# Patient Record
Sex: MALE | Race: WHITE | NOT HISPANIC OR LATINO | Employment: FULL TIME | URBAN - NONMETROPOLITAN AREA
[De-identification: names, ages, dates, MRNs, and addresses within clinical notes are randomized per-mention and may not be internally consistent; named-entity substitution may affect disease eponyms.]

---

## 2022-06-24 ENCOUNTER — OFFICE VISIT (OUTPATIENT)
Dept: FAMILY MEDICINE CLINIC | Facility: CLINIC | Age: 27
End: 2022-06-24

## 2022-06-24 ENCOUNTER — TELEPHONE (OUTPATIENT)
Dept: FAMILY MEDICINE CLINIC | Facility: CLINIC | Age: 27
End: 2022-06-24

## 2022-06-24 VITALS
DIASTOLIC BLOOD PRESSURE: 86 MMHG | HEIGHT: 63 IN | SYSTOLIC BLOOD PRESSURE: 147 MMHG | OXYGEN SATURATION: 97 % | TEMPERATURE: 98.2 F | WEIGHT: 312 LBS | BODY MASS INDEX: 55.28 KG/M2 | RESPIRATION RATE: 16 BRPM | HEART RATE: 87 BPM

## 2022-06-24 DIAGNOSIS — E11.65 TYPE 2 DIABETES MELLITUS WITH HYPERGLYCEMIA, WITHOUT LONG-TERM CURRENT USE OF INSULIN: Primary | ICD-10-CM

## 2022-06-24 DIAGNOSIS — E66.01 MORBID OBESITY WITH BODY MASS INDEX OF 50.0-59.9 IN ADULT: ICD-10-CM

## 2022-06-24 DIAGNOSIS — R03.0 ELEVATED BLOOD PRESSURE READING IN OFFICE WITHOUT DIAGNOSIS OF HYPERTENSION: ICD-10-CM

## 2022-06-24 DIAGNOSIS — R73.9 HYPERGLYCEMIA: Primary | ICD-10-CM

## 2022-06-24 LAB
EXPIRATION DATE: NORMAL
HBA1C MFR BLD: 9.2 %
Lab: NORMAL

## 2022-06-24 PROCEDURE — 83036 HEMOGLOBIN GLYCOSYLATED A1C: CPT | Performed by: NURSE PRACTITIONER

## 2022-06-24 PROCEDURE — 3046F HEMOGLOBIN A1C LEVEL >9.0%: CPT | Performed by: NURSE PRACTITIONER

## 2022-06-24 PROCEDURE — 99202 OFFICE O/P NEW SF 15 MIN: CPT | Performed by: NURSE PRACTITIONER

## 2022-06-24 NOTE — PROGRESS NOTES
"Chief Complaint  Establish Care and Prediabetes (Blood sugar has been running high, fasting blood sugar has been in the 200's )    Subjective        Elias Schroeder presents to [facility]  History of Present Illness    Patient diagnosed as prediabetic a few years ago.  Patient was on metformin but was able to control his sugars with diet and exercise.  Blood sugars running in the 200s lately and 189 was fasting level this morning.  Patient checks his fasting and post prandial blood glucose everyday.  Patient meal preps and eats a balanced diet. He walks about 20,000 steps a day while working and 10,000 steps on days off from work.      Objective   Vital Signs:  /86 (BP Location: Left arm, Patient Position: Sitting, Cuff Size: Adult)   Pulse 87   Temp 98.2 °F (36.8 °C)   Resp 16   Ht 160 cm (63\")   Wt (!) 142 kg (312 lb)   SpO2 97%   BMI 55.27 kg/m²   Estimated body mass index is 55.27 kg/m² as calculated from the following:    Height as of this encounter: 160 cm (63\").    Weight as of this encounter: 142 kg (312 lb).    Class 3 Severe Obesity (BMI >=40). Obesity-related health conditions include the following: diabetes mellitus. Obesity is improving with lifestyle modifications. BMI is is above average; BMI management plan is completed. We discussed portion control and increasing exercise.      Physical Exam  Vitals and nursing note reviewed.   Constitutional:       Appearance: He is obese.   HENT:      Head: Normocephalic.      Nose: Nose normal.      Mouth/Throat:      Mouth: Mucous membranes are moist.   Eyes:      Extraocular Movements: Extraocular movements intact.      Pupils: Pupils are equal, round, and reactive to light.   Cardiovascular:      Rate and Rhythm: Normal rate and regular rhythm.      Pulses: Normal pulses.      Heart sounds: Normal heart sounds.   Pulmonary:      Effort: Pulmonary effort is normal.      Breath sounds: Normal breath sounds.   Abdominal: "      General: Bowel sounds are normal.      Palpations: Abdomen is soft.   Musculoskeletal:         General: Normal range of motion.      Cervical back: Normal range of motion.      Right lower leg: No edema.      Left lower leg: No edema.   Lymphadenopathy:      Cervical: No cervical adenopathy.   Skin:     General: Skin is warm and dry.      Capillary Refill: Capillary refill takes less than 2 seconds.   Neurological:      General: No focal deficit present.      Mental Status: He is alert and oriented to person, place, and time.   Psychiatric:         Mood and Affect: Mood normal.         Behavior: Behavior normal.        Result Review :                Assessment and Plan   Diagnoses and all orders for this visit:    1. Hyperglycemia (Primary)  -     POC Glycosylated Hemoglobin (Hb A1C)    2. Morbid obesity with body mass index of 50.0-59.9 in adult (HCC)  Assessment & Plan:  Patient's (Body mass index is 55.27 kg/m².) indicates that they are morbidly obese (BMI > 40 or > 35 with obesity - related health condition) with health conditions that include diabetes mellitus . Weight is unchanged. BMI is is above average; BMI management plan is completed. We discussed portion control and increasing exercise.       3. Elevated blood pressure reading in office without diagnosis of hypertension  Comments:  Will recheck BP in office at annual physical in approximately 4 weeks.  Record BP twice a day for 1 week and bring to annual physical.           Follow Up   Return in about 4 weeks (around 7/22/2022) for Annual physical.  Patient was given instructions and counseling regarding his condition or for health maintenance advice. Please see specific information pulled into the AVS if appropriate.

## 2022-06-24 NOTE — TELEPHONE ENCOUNTER
Called patient and reviewed his Hgb A1c of 9.2.  Discussed diabetes management with addition of metformin.  Patient advised to take at night and advised of potential adverse effects of GI upset and diarrhea.  Will recheck A1c in 3 months.  Patient expressed understanding.

## 2022-06-28 PROBLEM — E66.01 MORBID OBESITY WITH BODY MASS INDEX OF 50.0-59.9 IN ADULT: Chronic | Status: ACTIVE | Noted: 2022-06-28

## 2022-06-28 PROBLEM — E66.01 MORBID OBESITY WITH BODY MASS INDEX OF 50.0-59.9 IN ADULT: Status: ACTIVE | Noted: 2022-06-28

## 2022-06-28 NOTE — ASSESSMENT & PLAN NOTE
Patient's (Body mass index is 55.27 kg/m².) indicates that they are morbidly obese (BMI > 40 or > 35 with obesity - related health condition) with health conditions that include diabetes mellitus . Weight is unchanged. BMI is is above average; BMI management plan is completed. We discussed portion control and increasing exercise.

## 2023-03-27 ENCOUNTER — CLINICAL SUPPORT (OUTPATIENT)
Dept: FAMILY MEDICINE CLINIC | Facility: CLINIC | Age: 28
End: 2023-03-27
Payer: COMMERCIAL

## 2023-03-27 ENCOUNTER — TELEPHONE (OUTPATIENT)
Dept: FAMILY MEDICINE CLINIC | Facility: CLINIC | Age: 28
End: 2023-03-27

## 2023-03-27 DIAGNOSIS — Z11.52 ENCOUNTER FOR SCREENING FOR COVID-19: Primary | ICD-10-CM

## 2023-03-27 LAB
EXPIRATION DATE: ABNORMAL
INTERNAL CONTROL: ABNORMAL
Lab: ABNORMAL
SARS-COV-2 AG UPPER RESP QL IA.RAPID: DETECTED

## 2023-03-27 NOTE — TELEPHONE ENCOUNTER
"  Caller: ESTEVAN GUTIERREZ    Relationship to patient: Mother    Best call back number: [phone]    Date of positive COVID19 test: 3/25/23 AT HOME TEST    COVID19 symptoms: LOSS OF TASTE/SMELL, HEADACHE    Additional information or concerns: PATIENT REQUESTING \"PAXLOVID\" BE CALLED INTO THE PHARMACY TO HELP WITH +COVID TEST.    What is the patients preferred pharmacy: [address] [phone] [facility] [phone]   [phone]          "

## 2023-03-27 NOTE — TELEPHONE ENCOUNTER
Spoke with patient and advised that we can not send him paxlovid without recent labs. Voiced understanding. Asked if he could come in for a nurse visit to get swabbed as his work will not accept at home covid results. Sent to Bhumi HERNANDEZ to schedule.

## 2023-08-29 ENCOUNTER — OFFICE VISIT (OUTPATIENT)
Dept: FAMILY MEDICINE CLINIC | Facility: CLINIC | Age: 28
End: 2023-08-29
Payer: COMMERCIAL

## 2023-08-29 VITALS
HEIGHT: 64 IN | OXYGEN SATURATION: 98 % | BODY MASS INDEX: 52.41 KG/M2 | SYSTOLIC BLOOD PRESSURE: 133 MMHG | DIASTOLIC BLOOD PRESSURE: 80 MMHG | RESPIRATION RATE: 18 BRPM | WEIGHT: 307 LBS | HEART RATE: 86 BPM | TEMPERATURE: 98.2 F

## 2023-08-29 DIAGNOSIS — E66.01 MORBID OBESITY WITH BODY MASS INDEX OF 50.0-59.9 IN ADULT: Chronic | ICD-10-CM

## 2023-08-29 DIAGNOSIS — R50.9 FEVER, UNSPECIFIED FEVER CAUSE: ICD-10-CM

## 2023-08-29 DIAGNOSIS — R09.89 CHEST CONGESTION: ICD-10-CM

## 2023-08-29 DIAGNOSIS — E11.65 TYPE 2 DIABETES MELLITUS WITH HYPERGLYCEMIA, WITHOUT LONG-TERM CURRENT USE OF INSULIN: Chronic | ICD-10-CM

## 2023-08-29 DIAGNOSIS — R51.9 NONINTRACTABLE HEADACHE, UNSPECIFIED CHRONICITY PATTERN, UNSPECIFIED HEADACHE TYPE: ICD-10-CM

## 2023-08-29 DIAGNOSIS — R68.83 CHILLS: ICD-10-CM

## 2023-08-29 DIAGNOSIS — J02.0 STREP PHARYNGITIS: Primary | ICD-10-CM

## 2023-08-29 LAB
EXPIRATION DATE: ABNORMAL
EXPIRATION DATE: NORMAL
EXPIRATION DATE: NORMAL
FLUAV AG UPPER RESP QL IA.RAPID: NOT DETECTED
FLUBV AG UPPER RESP QL IA.RAPID: NOT DETECTED
HBA1C MFR BLD: 8.3 %
INTERNAL CONTROL: NORMAL
INTERNAL CONTROL: NORMAL
Lab: ABNORMAL
Lab: NORMAL
Lab: NORMAL
S PYO AG THROAT QL: NEGATIVE
SARS-COV-2 AG UPPER RESP QL IA.RAPID: NOT DETECTED

## 2023-08-29 RX ORDER — AMOXICILLIN 500 MG/1
500 CAPSULE ORAL 2 TIMES DAILY
Qty: 20 CAPSULE | Refills: 0 | Status: SHIPPED | OUTPATIENT
Start: 2023-08-29 | End: 2023-09-08

## 2023-08-29 NOTE — PROGRESS NOTES
Subjective     Chief Complaint   Patient presents with    Sore Throat    Fever    URI    Chills    Headache       Sore Throat   Associated symptoms include headaches.   Fever   Associated symptoms include headaches and a sore throat.   URI   Associated symptoms include headaches and a sore throat.   Chills  Associated symptoms include chills, a fever, headaches and a sore throat.   Headache    Patient presents today with sore throat, fever of 102, chills, headache.  Symptoms started 2 days ago with a sore throat.  Symptoms getting worse.     Patient states that he is out of metformin for the last several months.  He is requesting a refill.     Patient's PMR from outside medical facility reviewed and noted.    Review of Systems   Constitutional:  Positive for chills and fever.   HENT:  Positive for sore throat.    Neurological:  Positive for headaches.      Otherwise complete ROS reviewed and negative except as mentioned in the HPI.    Past Medical History:   Past Medical History:   Diagnosis Date    ADHD     Anxiety     Depression     Insomnia     Polycystic ovarian syndrome     Prediabetes      Past Surgical History:  Past Surgical History:   Procedure Laterality Date    TONSILLECTOMY       Social History:  reports that he has never smoked. He has never used smokeless tobacco. He reports that he does not currently use alcohol. He reports that he does not use drugs.    Family History: family history includes Depression in his father and mother; Diabetes in his father and mother; Fibromyalgia in his maternal grandmother and mother; Heart attack in his mother; Kidney failure in his father; Lupus in his father.      Allergies:  Allergies   Allergen Reactions    Cinnamon Swelling    Silicon Rash     Medications:  Prior to Admission medications    Medication Sig Start Date End Date Taking? Authorizing Provider   metFORMIN (Glucophage) 850 MG tablet Take 1 tablet by mouth Every Night for 90 days. 6/24/22 9/22/22   "Karrie Samaniego APRN         PHQ-9 Depression Screening  Little interest or pleasure in doing things? 0-->not at all   Feeling down, depressed, or hopeless? 0-->not at all   Trouble falling or staying asleep, or sleeping too much?     Feeling tired or having little energy?     Poor appetite or overeating?     Feeling bad about yourself - or that you are a failure or have let yourself or your family down?     Trouble concentrating on things, such as reading the newspaper or watching television?     Moving or speaking so slowly that other people could have noticed? Or the opposite - being so fidgety or restless that you have been moving around a lot more than usual?     Thoughts that you would be better off dead, or of hurting yourself in some way?     PHQ-9 Total Score 0   If you checked off any problems, how difficult have these problems made it for you to do your work, take care of things at home, or get along with other people?         PHQ-9 Total Score: 0   0 (Negative screening for depression)  Support given, observe for worsening symptoms    Objective     Vital Signs: /80 (BP Location: Right arm, Patient Position: Sitting, Cuff Size: Adult)   Pulse 86   Temp 98.2 øF (36.8 øC) (Infrared)   Resp 18   Ht 162.6 cm (64\")   Wt (!) 139 kg (307 lb)   SpO2 98%   BMI 52.70 kg/mý   Physical Exam  Vitals and nursing note reviewed.   Constitutional:       Appearance: He is morbidly obese.   HENT:      Head: Normocephalic.      Right Ear: Tympanic membrane is bulging.      Left Ear: Tympanic membrane is bulging.      Nose: Nose normal.      Mouth/Throat:      Mouth: Mucous membranes are moist.      Pharynx: Posterior oropharyngeal erythema present.   Eyes:      Conjunctiva/sclera: Conjunctivae normal.   Cardiovascular:      Rate and Rhythm: Normal rate and regular rhythm.      Pulses: Normal pulses.      Heart sounds: Normal heart sounds.   Pulmonary:      Effort: Pulmonary effort is normal.      " Breath sounds: Normal breath sounds.   Abdominal:      General: Bowel sounds are normal.      Palpations: Abdomen is soft.   Musculoskeletal:         General: Normal range of motion.      Cervical back: Normal range of motion.   Lymphadenopathy:      Cervical: Cervical adenopathy present.   Skin:     General: Skin is warm and dry.   Neurological:      General: No focal deficit present.      Mental Status: He is alert and oriented to person, place, and time.   Psychiatric:         Mood and Affect: Mood normal.         Behavior: Behavior normal.       Class 3 Severe Obesity (BMI >=40). Obesity-related health conditions include the following: diabetes mellitus. Obesity is newly identified. BMI is is above average; BMI management plan is completed. We discussed low calorie, low carb based diet program, portion control, and increasing exercise.        Results Reviewed:  Hemoglobin A1C   Date Value Ref Range Status   08/29/2023 8.3 % Final     POCT SARS-CoV-2 Antigen ART + Flu (08/29/2023 10:11)   POCT rapid strep A (08/29/2023 10:11)   Assessment / Plan     Assessment/Plan     Diagnoses and all orders for this visit:    1. Strep pharyngitis (Primary)  -     amoxicillin (AMOXIL) 500 MG capsule; Take 1 capsule by mouth 2 (Two) Times a Day for 10 days.  Dispense: 20 capsule; Refill: 0    2. Type 2 diabetes mellitus with hyperglycemia, without long-term current use of insulin  -     Cancel: Microalbumin / Creatinine Urine Ratio - Urine, Clean Catch  -     POC Glycosylated Hemoglobin (Hb A1C)    3. Morbid obesity with body mass index of 50.0-59.9 in adult    4. Nonintractable headache, unspecified chronicity pattern, unspecified headache type  -     POCT rapid strep A  -     POCT SARS-CoV-2 Antigen ART + Flu    5. Fever, unspecified fever cause  -     POCT rapid strep A  -     POCT SARS-CoV-2 Antigen ART + Flu    6. Chest congestion  -     POCT rapid strep A  -     POCT SARS-CoV-2 Antigen ART + Flu    7. Chills  -     POCT  rapid strep A  -     POCT SARS-CoV-2 Antigen ART + Flu         An After Visit Summary was printed and given to the patient at discharge.  Return in about 3 months (around 11/29/2023) for Annual physical.    I have discussed the patient results/orders and plan/recommendation with them at today's visit.      Karrie Samaniego, EMANUEL   08/29/2023

## 2023-08-31 ENCOUNTER — TELEPHONE (OUTPATIENT)
Dept: FAMILY MEDICINE CLINIC | Facility: CLINIC | Age: 28
End: 2023-08-31
Payer: COMMERCIAL

## 2023-08-31 DIAGNOSIS — E11.65 TYPE 2 DIABETES MELLITUS WITH HYPERGLYCEMIA, WITHOUT LONG-TERM CURRENT USE OF INSULIN: ICD-10-CM

## 2023-08-31 NOTE — TELEPHONE ENCOUNTER
Patient called and stated that the pharmacy did not receive prescription for metFORMIN (Glucophage) 850 MG tablet . Patient stated that he is out.     He also wanted to let Karrie know that he has not started his antibiotic yet because the pharmacy is out. He said he is supposed to get it today. I let him know that if he does not get it, we can call it in to a different pharmacy.

## 2023-11-27 ENCOUNTER — CLINICAL SUPPORT (OUTPATIENT)
Dept: FAMILY MEDICINE CLINIC | Facility: CLINIC | Age: 28
End: 2023-11-27
Payer: COMMERCIAL

## 2023-11-27 DIAGNOSIS — Z13.220 SCREENING FOR LIPID DISORDERS: ICD-10-CM

## 2023-11-27 DIAGNOSIS — Z11.59 ENCOUNTER FOR HEPATITIS C SCREENING TEST FOR LOW RISK PATIENT: Primary | ICD-10-CM

## 2023-11-27 DIAGNOSIS — E11.65 TYPE 2 DIABETES MELLITUS WITH HYPERGLYCEMIA, WITHOUT LONG-TERM CURRENT USE OF INSULIN: Chronic | ICD-10-CM

## 2023-11-27 DIAGNOSIS — Z13.0 SCREENING FOR DEFICIENCY ANEMIA: ICD-10-CM

## 2023-11-27 DIAGNOSIS — Z13.29 SCREENING FOR THYROID DISORDER: ICD-10-CM

## 2023-11-27 DIAGNOSIS — E66.01 MORBID OBESITY WITH BODY MASS INDEX OF 50.0-59.9 IN ADULT: Chronic | ICD-10-CM

## 2023-11-27 NOTE — PROGRESS NOTES
Venipuncture Blood Specimen Collection  Venipuncture performed in right hand by Loraine Moreno LPN with good hemostasis. Patient tolerated the procedure well without complications.   11/27/23   Loraine Moreno LPN

## 2023-11-28 LAB
ALBUMIN SERPL-MCNC: 4.2 G/DL (ref 4.3–5.2)
ALBUMIN/GLOB SERPL: 1.6 {RATIO} (ref 1.2–2.2)
ALP SERPL-CCNC: 84 IU/L (ref 44–121)
ALT SERPL-CCNC: 32 IU/L (ref 0–44)
AST SERPL-CCNC: 19 IU/L (ref 0–40)
BASOPHILS # BLD AUTO: 0 X10E3/UL (ref 0–0.2)
BASOPHILS NFR BLD AUTO: 0 %
BILIRUB SERPL-MCNC: 0.3 MG/DL (ref 0–1.2)
BUN SERPL-MCNC: 7 MG/DL (ref 6–20)
BUN/CREAT SERPL: 12 (ref 9–20)
CALCIUM SERPL-MCNC: 8.6 MG/DL (ref 8.7–10.2)
CHLORIDE SERPL-SCNC: 102 MMOL/L (ref 96–106)
CHOLEST SERPL-MCNC: 166 MG/DL (ref 100–199)
CO2 SERPL-SCNC: 22 MMOL/L (ref 20–29)
CREAT SERPL-MCNC: 0.6 MG/DL (ref 0.76–1.27)
EGFRCR SERPLBLD CKD-EPI 2021: 137 ML/MIN/1.73
EOSINOPHIL # BLD AUTO: 0.2 X10E3/UL (ref 0–0.4)
EOSINOPHIL NFR BLD AUTO: 3 %
ERYTHROCYTE [DISTWIDTH] IN BLOOD BY AUTOMATED COUNT: 14.4 % (ref 11.6–15.4)
GLOBULIN SER CALC-MCNC: 2.6 G/DL (ref 1.5–4.5)
GLUCOSE SERPL-MCNC: 246 MG/DL (ref 70–99)
HBA1C MFR BLD: 9.2 % (ref 4.8–5.6)
HCT VFR BLD AUTO: 37.9 % (ref 37.5–51)
HCV IGG SERPL QL IA: NON REACTIVE
HDLC SERPL-MCNC: 71 MG/DL
HGB BLD-MCNC: 12.1 G/DL (ref 13–17.7)
IMM GRANULOCYTES # BLD AUTO: 0 X10E3/UL (ref 0–0.1)
IMM GRANULOCYTES NFR BLD AUTO: 0 %
LDLC SERPL CALC-MCNC: 82 MG/DL (ref 0–99)
LYMPHOCYTES # BLD AUTO: 2.2 X10E3/UL (ref 0.7–3.1)
LYMPHOCYTES NFR BLD AUTO: 31 %
MCH RBC QN AUTO: 24.1 PG (ref 26.6–33)
MCHC RBC AUTO-ENTMCNC: 31.9 G/DL (ref 31.5–35.7)
MCV RBC AUTO: 76 FL (ref 79–97)
MONOCYTES # BLD AUTO: 0.5 X10E3/UL (ref 0.1–0.9)
MONOCYTES NFR BLD AUTO: 6 %
NEUTROPHILS # BLD AUTO: 4.3 X10E3/UL (ref 1.4–7)
NEUTROPHILS NFR BLD AUTO: 60 %
PLATELET # BLD AUTO: 335 X10E3/UL (ref 150–450)
POTASSIUM SERPL-SCNC: 4.4 MMOL/L (ref 3.5–5.2)
PROT SERPL-MCNC: 6.8 G/DL (ref 6–8.5)
RBC # BLD AUTO: 5.02 X10E6/UL (ref 4.14–5.8)
SODIUM SERPL-SCNC: 139 MMOL/L (ref 134–144)
T4 FREE SERPL-MCNC: 1.4 NG/DL (ref 0.82–1.77)
TRIGL SERPL-MCNC: 68 MG/DL (ref 0–149)
TSH SERPL DL<=0.005 MIU/L-ACNC: 2.39 UIU/ML (ref 0.45–4.5)
VLDLC SERPL CALC-MCNC: 13 MG/DL (ref 5–40)
WBC # BLD AUTO: 7.2 X10E3/UL (ref 3.4–10.8)

## 2023-11-29 ENCOUNTER — OFFICE VISIT (OUTPATIENT)
Dept: FAMILY MEDICINE CLINIC | Facility: CLINIC | Age: 28
End: 2023-11-29
Payer: COMMERCIAL

## 2023-11-29 VITALS
BODY MASS INDEX: 54.39 KG/M2 | DIASTOLIC BLOOD PRESSURE: 88 MMHG | WEIGHT: 307 LBS | RESPIRATION RATE: 18 BRPM | OXYGEN SATURATION: 100 % | HEART RATE: 84 BPM | TEMPERATURE: 98.4 F | HEIGHT: 63 IN | SYSTOLIC BLOOD PRESSURE: 134 MMHG

## 2023-11-29 DIAGNOSIS — Z00.01 ENCOUNTER FOR ROUTINE ADULT PHYSICAL EXAM WITH ABNORMAL FINDINGS: Primary | ICD-10-CM

## 2023-11-29 DIAGNOSIS — D50.9 IRON DEFICIENCY ANEMIA, UNSPECIFIED IRON DEFICIENCY ANEMIA TYPE: ICD-10-CM

## 2023-11-29 DIAGNOSIS — R20.2 NUMBNESS AND TINGLING IN BOTH HANDS: ICD-10-CM

## 2023-11-29 DIAGNOSIS — R20.0 NUMBNESS AND TINGLING IN BOTH HANDS: ICD-10-CM

## 2023-11-29 DIAGNOSIS — E66.01 MORBID OBESITY WITH BODY MASS INDEX OF 50.0-59.9 IN ADULT: Chronic | ICD-10-CM

## 2023-11-29 DIAGNOSIS — E11.65 TYPE 2 DIABETES MELLITUS WITH HYPERGLYCEMIA, WITHOUT LONG-TERM CURRENT USE OF INSULIN: ICD-10-CM

## 2023-11-29 DIAGNOSIS — E11.9 ENCOUNTER FOR DIABETIC FOOT EXAM: ICD-10-CM

## 2023-11-29 RX ORDER — ORAL SEMAGLUTIDE 7 MG/1
7 TABLET ORAL DAILY
Qty: 30 TABLET | Refills: 0 | Status: SHIPPED | OUTPATIENT
Start: 2023-11-29

## 2023-11-29 RX ORDER — FLUCONAZOLE 150 MG/1
150 TABLET ORAL ONCE
Qty: 1 TABLET | Refills: 0 | Status: SHIPPED | OUTPATIENT
Start: 2023-11-29 | End: 2023-11-29

## 2023-11-29 RX ORDER — FERROUS SULFATE 324(65)MG
324 TABLET, DELAYED RELEASE (ENTERIC COATED) ORAL
Qty: 30 TABLET | Refills: 11 | Status: SHIPPED | OUTPATIENT
Start: 2023-11-29

## 2023-11-29 RX ORDER — ORAL SEMAGLUTIDE 3 MG/1
3 TABLET ORAL DAILY
Qty: 30 TABLET | Refills: 0 | COMMUNITY
Start: 2023-11-29
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
## 2023-11-30 ENCOUNTER — TELEPHONE (OUTPATIENT)
Dept: FAMILY MEDICINE CLINIC | Facility: CLINIC | Age: 28
End: 2023-11-30

## 2023-11-30 LAB
ALBUMIN/CREAT UR: 11 MG/G CREAT (ref 0–29)
CREAT UR-MCNC: 66.5 MG/DL
MICROALBUMIN UR-MCNC: 7.2 UG/ML

## 2023-11-30 NOTE — TELEPHONE ENCOUNTER
I have done this PA and his insurance is not going to pay for rybelsus. Is there something else he can take?

## 2023-11-30 NOTE — TELEPHONE ENCOUNTER
Caller: Elias Schroeder    Relationship to patient: Self    Best call back number: [phone]     Patient is needing: A PRIOR AUTHORIZATION ON THE     Semaglutide (Rybelsus) 3 MG tablet       Semaglutide (Rybelsus) 7 MG tablet

## 2023-12-01 ENCOUNTER — TELEPHONE (OUTPATIENT)
Dept: FAMILY MEDICINE CLINIC | Facility: CLINIC | Age: 28
End: 2023-12-01
Payer: COMMERCIAL

## 2023-12-01 DIAGNOSIS — B37.31 VAGINAL YEAST INFECTION: Primary | ICD-10-CM

## 2023-12-01 PROBLEM — D50.9 IRON DEFICIENCY ANEMIA: Status: ACTIVE | Noted: 2023-12-01

## 2023-12-01 RX ORDER — FLUCONAZOLE 150 MG/1
150 TABLET ORAL
Qty: 2 TABLET | Refills: 0 | Status: SHIPPED | OUTPATIENT
Start: 2023-12-01 | End: 2023-12-05

## 2023-12-01 NOTE — TELEPHONE ENCOUNTER
Caller: Elias Schroeder    Relationship: Self    Best call back number: [phone]     What medication are you requesting: DIFLUCAN    What are your current symptoms: YEAST INFECTION    How long have you been experiencing symptoms:     Have you had these symptoms before:    [x] Yes  [] No    Have you been treated for these symptoms before:   [x] Yes  [] No    If a prescription is needed, what is your preferred pharmacy and phone number: [facility] [address] [place] [address] [phone] [facility] [phone] FX     Additional notes: PATIENT STATED AUSTEN TOLD HIM THAT IF HE NEEDED ANOTHER DOSE TO JUST CALL AND LET HER KNOW

## 2023-12-01 NOTE — ASSESSMENT & PLAN NOTE
Diabetes is worsening.  Previous A1c was 8.3, most recent A1c is 9.2.   Continue with metformin 1000 BID.  Sample given for Rybelsus 3 mg for 30 days.  Then ramp up to Rybelsus 7 mg for 30 days - prescription sent to pharmacy.  Diabetic, low carb diet and weight loss encouraged.    Diabetes will be reassessed in 3 months.  Repeat A1c in 3 months.

## 2023-12-01 NOTE — TELEPHONE ENCOUNTER
Just got the letter on cover my meds, they need more information so I am going to attach the office note when it is done and try to appeal it.

## 2023-12-05 ENCOUNTER — TELEPHONE (OUTPATIENT)
Dept: FAMILY MEDICINE CLINIC | Facility: CLINIC | Age: 28
End: 2023-12-05
Payer: COMMERCIAL

## 2023-12-05 DIAGNOSIS — B96.89 BACTERIAL VAGINOSIS: Primary | ICD-10-CM

## 2023-12-05 DIAGNOSIS — N76.0 BACTERIAL VAGINOSIS: Primary | ICD-10-CM

## 2023-12-05 RX ORDER — METRONIDAZOLE 500 MG/1
500 TABLET ORAL 2 TIMES DAILY
Qty: 14 TABLET | Refills: 0 | Status: SHIPPED | OUTPATIENT
Start: 2023-12-05 | End: 2023-12-12

## 2023-12-05 NOTE — TELEPHONE ENCOUNTER
Patient states still having symptoms of yeast infection. Would like another dose of diflucan sent to [facility] pharmacy. Going out of town on wednesday

## 2023-12-07 ENCOUNTER — TELEPHONE (OUTPATIENT)
Dept: FAMILY MEDICINE CLINIC | Facility: CLINIC | Age: 28
End: 2023-12-07
Payer: COMMERCIAL

## 2023-12-07 ENCOUNTER — REFERRAL TRIAGE (OUTPATIENT)
Dept: CASE MANAGEMENT | Facility: OTHER | Age: 28
End: 2023-12-07
Payer: COMMERCIAL

## 2023-12-07 ENCOUNTER — TELEPHONE (OUTPATIENT)
Dept: FAMILY MEDICINE CLINIC | Facility: CLINIC | Age: 28
End: 2023-12-07

## 2023-12-07 DIAGNOSIS — Z59.89 HAS HEALTH INSURANCE WITH INADEQUATE COVERAGE OF HEALTH EXPENSES: Primary | ICD-10-CM

## 2023-12-07 SDOH — ECONOMIC STABILITY - INCOME SECURITY: OTHER PROBLEMS RELATED TO HOUSING AND ECONOMIC CIRCUMSTANCES: Z59.89

## 2023-12-07 NOTE — TELEPHONE ENCOUNTER
Caller: Elias Schroeder    Relationship: Self    Best call back number: [phone]     Which medication are you concerned about: RYBELSUS 7 MG    Who prescribed you this medication: MELANIE    When did you start taking this medication: ONLY TOOK THE SAMPLES PROVIDED BY OFFICE    What are your concerns: AFTER CHECKING WITH INSURANCE IT'S TOO EXPENSIVE WOULD LIKE TO FIND AN ALTERNATIVE

## 2023-12-07 NOTE — TELEPHONE ENCOUNTER
Caller: Elias Schroeder    Relationship: Self    Best call back number: [phone]     What is the best time to reach you: ANY     Who are you requesting to speak with (clinical staff, provider,  specific staff member): CLINICAL    Do you know the name of the person who called: SELF    What was the call regarding: HAD DIABETIC EYE SCREENING AND ALL WAS CLEAR

## 2023-12-07 NOTE — TELEPHONE ENCOUNTER
The PA appeal was approved but patient can not afford after insurance. Is there something else he can take?

## 2023-12-08 ENCOUNTER — PATIENT OUTREACH (OUTPATIENT)
Dept: CASE MANAGEMENT | Facility: OTHER | Age: 28
End: 2023-12-08
Payer: COMMERCIAL

## 2023-12-08 ENCOUNTER — PRIOR AUTHORIZATION (OUTPATIENT)
Dept: INTERNAL MEDICINE | Facility: CLINIC | Age: 28
End: 2023-12-08
Payer: COMMERCIAL

## 2023-12-08 NOTE — OUTREACH NOTE
AMBULATORY CASE MANAGEMENT NOTE    Name and Relationship of Patient/Support Person: Elias Schroeder A - Self    Patient Outreach    Received PCP referral for medication assistance. Spoke with patient and he is currently out in public and not able to talk openly. Call arranged for next week.         Gricel SHANKAR  Ambulatory Case Management    12/8/2023, 12:19 CST

## 2023-12-08 NOTE — TELEPHONE ENCOUNTER
Eliaskristen Schroeder (Key: [id number])  Rx #: [id number]  Rybelsus 7MG tablets     Form  [employer] Electronic Prior Authorization Form (2017 [employer]CP[employer])  Created  9 days ago  Sent to Plan  8 days ago  Plan Response  8 days ago  Submit Clinical Questions  8 days ago  Determination  Favorable  8 days ago  Message from Plan  Request Reference Number: [id number]. RYBELSUS TAB 7MG is denied for not meeting the prior authorization requirement(s). Details of this decision are in the notice attached below or have been faxed to you. Appeal Case [id number] is overturned. For further questions, call [phone].

## 2023-12-14 ENCOUNTER — PATIENT OUTREACH (OUTPATIENT)
Dept: CASE MANAGEMENT | Facility: OTHER | Age: 28
End: 2023-12-14
Payer: COMMERCIAL

## 2023-12-14 NOTE — OUTREACH NOTE
AMBULATORY CASE MANAGEMENT NOTE    Name and Relationship of Patient/Support Person: SchroederElias A - Self    Patient Outreach    Received VM message from patient this AM stating he forgot about our scheduled call yesterday. He is also dealing with his father who was recently placed in Hospice care. He requested a return call today.     Patient Outreach    Spoke with patient. He stated the Rybelsus is working well at this time. He was not aware that assistance for Farxiga had been requested.    Care Coordination    Message to PCP to clarify if assistance if for Rybelsus, Farxiga, or both medications.     Care Coordination    Received message from PCP that the medication is Rybelsus only for assistance.     Patient Outreach    Contacted patient to inform of the above information.     Gricel SHANKAR  Ambulatory Case Management    12/14/2023, 12:32 CST

## 2023-12-15 ENCOUNTER — PATIENT OUTREACH (OUTPATIENT)
Dept: CASE MANAGEMENT | Facility: OTHER | Age: 28
End: 2023-12-15
Payer: COMMERCIAL

## 2023-12-15 NOTE — OUTREACH NOTE
AMBULATORY CASE MANAGEMENT NOTE    Name and Relationship of Patient/Support Person: Elias Schroeder A - Self    Patient Outreach    Spoke with patient to get permission to send Rybelsus savings card to his email.He will provide information to his pharmacy and update ACM if he was able to obtain the medication at a discount.        Gricel SHANKAR  Ambulatory Case Management    12/15/2023, 10:23 CST

## 2024-01-03 ENCOUNTER — TELEPHONE (OUTPATIENT)
Dept: FAMILY MEDICINE CLINIC | Facility: CLINIC | Age: 29
End: 2024-01-03
Payer: COMMERCIAL

## 2024-01-03 DIAGNOSIS — E11.65 TYPE 2 DIABETES MELLITUS WITH HYPERGLYCEMIA, WITHOUT LONG-TERM CURRENT USE OF INSULIN: ICD-10-CM

## 2024-01-03 RX ORDER — ORAL SEMAGLUTIDE 7 MG/1
7 TABLET ORAL DAILY
Qty: 30 TABLET | Refills: 5 | Status: SHIPPED | OUTPATIENT
Start: 2024-01-03

## 2024-01-03 NOTE — TELEPHONE ENCOUNTER
PATIENT PICKED UP RYBELSUS, 7MG FROM PHARMACY AND HAS LOST THE PRESCRIPTION. HE REQUEST ANOTHER BE CALLED IN AND MAY NEED ANOTHER VOUCHER TO LOWER COST.

## 2024-01-04 ENCOUNTER — PATIENT OUTREACH (OUTPATIENT)
Dept: CASE MANAGEMENT | Facility: OTHER | Age: 29
End: 2024-01-04
Payer: COMMERCIAL

## 2024-01-04 NOTE — OUTREACH NOTE
AMBULATORY CASE MANAGEMENT NOTE    Name and Relationship of Patient/Support Person: Elias Schroeder A - Self    Patient Outreach    Spoke with patient and he lost his Reybelsus during the passing of his father. PCP sent new prescription to his pharmacy yesterday. He still has the savings card in his email that he will present to pharmacy when pickup up the medication. He will contact ACM later today to ensure no issues arose with medication pickup.         Gricel SHANKAR  Ambulatory Case Management    1/4/2024, 12:03 CST

## 2024-01-11 ENCOUNTER — PATIENT MESSAGE (OUTPATIENT)
Dept: FAMILY MEDICINE CLINIC | Facility: CLINIC | Age: 29
End: 2024-01-11

## 2024-01-16 ENCOUNTER — PATIENT OUTREACH (OUTPATIENT)
Dept: CASE MANAGEMENT | Facility: OTHER | Age: 29
End: 2024-01-16
Payer: COMMERCIAL

## 2024-01-16 NOTE — OUTREACH NOTE
AMBULATORY CASE MANAGEMENT NOTE    Name and Relationship of Patient/Support Person: Elias Schroeder A - Self    Patient Outreach    HRCM follow up. Patient reports filling the Rybelsus on 1.8.24 and is taking without issues. Discussed having 5 refills on the medication. Patient will contact [facility] with any issues.           Gricel SHANKAR  Ambulatory Case Management    1/16/2024, 10:25 CST

## 2024-02-09 ENCOUNTER — PATIENT OUTREACH (OUTPATIENT)
Dept: CASE MANAGEMENT | Facility: OTHER | Age: 29
End: 2024-02-09
Payer: COMMERCIAL

## 2024-02-09 NOTE — OUTREACH NOTE
AMBULATORY CASE MANAGEMENT NOTE    Name and Relationship of Patient/Support Person: Elias Schroeder A - Self    Patient Outreach    HRCM follow up. Patient is having some sever skin cracking open on his knuckles. He is unsure if this is a side effect of Rybelsus. He is staying hydrated and can not correlate the issue with weather. He will discuss concern with PCP at 2.28.24 appointment. He will contact ACM if there are issues with filling Rybelsus.         Gricel SHANKAR  Ambulatory Case Management    2/9/2024, 09:52 CST

## 2024-05-01 ENCOUNTER — OFFICE VISIT (OUTPATIENT)
Dept: FAMILY MEDICINE CLINIC | Facility: CLINIC | Age: 29
End: 2024-05-01
Payer: COMMERCIAL

## 2024-05-01 VITALS
OXYGEN SATURATION: 98 % | BODY MASS INDEX: 53.86 KG/M2 | SYSTOLIC BLOOD PRESSURE: 132 MMHG | HEART RATE: 83 BPM | RESPIRATION RATE: 18 BRPM | TEMPERATURE: 98 F | WEIGHT: 304 LBS | HEIGHT: 63 IN | DIASTOLIC BLOOD PRESSURE: 85 MMHG

## 2024-05-01 DIAGNOSIS — M25.561 ACUTE PAIN OF RIGHT KNEE: ICD-10-CM

## 2024-05-01 DIAGNOSIS — M25.50 MULTIPLE JOINT PAIN: ICD-10-CM

## 2024-05-01 DIAGNOSIS — R53.83 FATIGUE, UNSPECIFIED TYPE: ICD-10-CM

## 2024-05-01 DIAGNOSIS — R76.8 POSITIVE ANA (ANTINUCLEAR ANTIBODY): Primary | ICD-10-CM

## 2024-05-01 DIAGNOSIS — E11.65 TYPE 2 DIABETES MELLITUS WITH HYPERGLYCEMIA, WITHOUT LONG-TERM CURRENT USE OF INSULIN: Chronic | ICD-10-CM

## 2024-05-01 DIAGNOSIS — M79.10 MYALGIA: ICD-10-CM

## 2024-05-01 DIAGNOSIS — E66.01 MORBID OBESITY WITH BODY MASS INDEX OF 50.0-59.9 IN ADULT: Chronic | ICD-10-CM

## 2024-05-01 PROCEDURE — 99214 OFFICE O/P EST MOD 30 MIN: CPT | Performed by: NURSE PRACTITIONER

## 2024-05-01 RX ORDER — ONDANSETRON 4 MG/1
TABLET, FILM COATED ORAL
COMMUNITY
End: 2024-05-01

## 2024-05-01 RX ORDER — ORAL SEMAGLUTIDE 3 MG/1
3 TABLET ORAL DAILY
Qty: 30 TABLET | Refills: 0 | COMMUNITY
Start: 2024-05-01

## 2024-05-01 RX ORDER — TAMSULOSIN HYDROCHLORIDE 0.4 MG/1
CAPSULE ORAL
COMMUNITY
End: 2024-05-01

## 2024-05-02 ENCOUNTER — TELEPHONE (OUTPATIENT)
Dept: FAMILY MEDICINE CLINIC | Facility: CLINIC | Age: 29
End: 2024-05-02
Payer: COMMERCIAL

## 2024-05-02 RX ORDER — FLUCONAZOLE 150 MG/1
150 TABLET ORAL ONCE
Qty: 1 TABLET | Refills: 0 | Status: SHIPPED | OUTPATIENT
Start: 2024-05-02 | End: 2024-05-02

## 2024-05-02 NOTE — TELEPHONE ENCOUNTER
Caller: Elias Schroeder    Relationship: Self    Best call back number: [phone]    What medication are you requesting: DIFLUCAN     What are your current symptoms: BLOOD SUGAR RUNNING HIGH     How long have you been experiencing symptoms: LAST COUPLE OF DAYS FORGOT TO MENTION WHILE IN OFFICE     Have you had these symptoms before:    [x] Yes  [] No    Have you been treated for these symptoms before:   [x] Yes  [] No    If a prescription is needed, what is your preferred pharmacy and phone number: [address] [phone] [facility] [phone]

## 2024-05-03 DIAGNOSIS — D16.21 OSTEOCHONDROMA OF FEMUR, RIGHT: Primary | ICD-10-CM

## 2024-05-06 ENCOUNTER — TELEPHONE (OUTPATIENT)
Dept: FAMILY MEDICINE CLINIC | Facility: CLINIC | Age: 29
End: 2024-05-06
Payer: COMMERCIAL

## 2024-05-06 LAB
25(OH)D3+25(OH)D2 SERPL-MCNC: 17.8 NG/ML (ref 30–100)
ALBUMIN SERPL-MCNC: 4.1 G/DL (ref 4.3–5.2)
ALBUMIN/GLOB SERPL: 1.4 {RATIO} (ref 1.2–2.2)
ALP SERPL-CCNC: 87 IU/L (ref 44–121)
ALT SERPL-CCNC: 28 IU/L (ref 0–44)
ANA HOMOGEN TITR SER: NORMAL {TITER}
ANA SER QL IF: POSITIVE
AST SERPL-CCNC: 19 IU/L (ref 0–40)
BILIRUB SERPL-MCNC: 0.2 MG/DL (ref 0–1.2)
BUN SERPL-MCNC: 8 MG/DL (ref 6–20)
BUN/CREAT SERPL: 13 (ref 9–20)
CALCIUM SERPL-MCNC: 8.8 MG/DL (ref 8.7–10.2)
CENTROMERE B AB SER-ACNC: <0.2 AI (ref 0–0.9)
CHLORIDE SERPL-SCNC: 100 MMOL/L (ref 96–106)
CHROMATIN AB SERPL-ACNC: <0.2 AI (ref 0–0.9)
CO2 SERPL-SCNC: 20 MMOL/L (ref 20–29)
CREAT SERPL-MCNC: 0.64 MG/DL (ref 0.76–1.27)
CRP SERPL-MCNC: 27 MG/L (ref 0–10)
DSDNA AB SER-ACNC: 1 IU/ML (ref 0–9)
EGFRCR SERPLBLD CKD-EPI 2021: 134 ML/MIN/1.73
ENA JO1 AB SER-ACNC: <0.2 AI (ref 0–0.9)
ENA RNP AB SER-ACNC: <0.2 AI (ref 0–0.9)
ENA SCL70 AB SER-ACNC: <0.2 AI (ref 0–0.9)
ENA SM AB SER-ACNC: <0.2 AI (ref 0–0.9)
ENA SS-A AB SER-ACNC: <0.2 AI (ref 0–0.9)
ENA SS-B AB SER-ACNC: <0.2 AI (ref 0–0.9)
ERYTHROCYTE [DISTWIDTH] IN BLOOD BY AUTOMATED COUNT: 14.4 % (ref 11.6–15.4)
ERYTHROCYTE [SEDIMENTATION RATE] IN BLOOD BY WESTERGREN METHOD: 62 MM/HR (ref 0–15)
GLOBULIN SER CALC-MCNC: 3 G/DL (ref 1.5–4.5)
GLUCOSE SERPL-MCNC: 274 MG/DL (ref 70–99)
HBA1C MFR BLD: 9.9 % (ref 4.8–5.6)
HCT VFR BLD AUTO: 40.7 % (ref 37.5–51)
HGB BLD-MCNC: 12.7 G/DL (ref 13–17.7)
LABORATORY COMMENT REPORT: ABNORMAL
Lab: NORMAL
Lab: NORMAL
MCH RBC QN AUTO: 23.7 PG (ref 26.6–33)
MCHC RBC AUTO-ENTMCNC: 31.2 G/DL (ref 31.5–35.7)
MCV RBC AUTO: 76 FL (ref 79–97)
PLATELET # BLD AUTO: 325 X10E3/UL (ref 150–450)
POTASSIUM SERPL-SCNC: 4.1 MMOL/L (ref 3.5–5.2)
PROT SERPL-MCNC: 7.1 G/DL (ref 6–8.5)
RBC # BLD AUTO: 5.36 X10E6/UL (ref 4.14–5.8)
RHEUMATOID FACT SERPL-ACNC: 10.3 IU/ML
SODIUM SERPL-SCNC: 136 MMOL/L (ref 134–144)
T4 FREE SERPL-MCNC: 1.55 NG/DL (ref 0.82–1.77)
TSH SERPL DL<=0.005 MIU/L-ACNC: 1.83 UIU/ML (ref 0.45–4.5)
WBC # BLD AUTO: 7.9 X10E3/UL (ref 3.4–10.8)

## 2024-05-06 NOTE — TELEPHONE ENCOUNTER
Patient called and wanted to make sure Karrie knew that his dad had Lupus and actually passed away from complications from it. I am not sure if it is in his chart or not.    He also wanted to let Karrie know that he found his Rybelsus 7mg prescription, it does not  until . He was wanting to know since he just restarted the rybelsus 3mg on 24, does he need to wait to start the 7 mg again?    Please advise. I let patient know that we will call him back.

## 2024-05-15 ENCOUNTER — OFFICE VISIT (OUTPATIENT)
Dept: FAMILY MEDICINE CLINIC | Facility: CLINIC | Age: 29
End: 2024-05-15
Payer: COMMERCIAL

## 2024-05-15 VITALS
DIASTOLIC BLOOD PRESSURE: 83 MMHG | RESPIRATION RATE: 18 BRPM | BODY MASS INDEX: 52.98 KG/M2 | OXYGEN SATURATION: 98 % | HEIGHT: 63 IN | TEMPERATURE: 97.9 F | WEIGHT: 299 LBS | SYSTOLIC BLOOD PRESSURE: 136 MMHG | HEART RATE: 75 BPM

## 2024-05-15 DIAGNOSIS — Z78.9 FEMALE-TO-MALE TRANSGENDER PERSON: ICD-10-CM

## 2024-05-15 DIAGNOSIS — E66.01 MORBID OBESITY WITH BODY MASS INDEX OF 50.0-59.9 IN ADULT: Chronic | ICD-10-CM

## 2024-05-15 DIAGNOSIS — R06.00 DYSPNEA, UNSPECIFIED TYPE: ICD-10-CM

## 2024-05-15 DIAGNOSIS — R55 NEAR SYNCOPE: ICD-10-CM

## 2024-05-15 DIAGNOSIS — R00.2 PALPITATIONS: ICD-10-CM

## 2024-05-15 DIAGNOSIS — F33.2 SEVERE EPISODE OF RECURRENT MAJOR DEPRESSIVE DISORDER, WITHOUT PSYCHOTIC FEATURES: ICD-10-CM

## 2024-05-15 DIAGNOSIS — M25.50 MULTIPLE JOINT PAIN: ICD-10-CM

## 2024-05-15 DIAGNOSIS — R07.9 CHEST PAIN, UNSPECIFIED TYPE: Primary | ICD-10-CM

## 2024-05-15 RX ORDER — DULOXETIN HYDROCHLORIDE 30 MG/1
30 CAPSULE, DELAYED RELEASE ORAL DAILY
Qty: 30 CAPSULE | Refills: 2 | Status: SHIPPED | OUTPATIENT
Start: 2024-05-15

## 2024-05-15 RX ORDER — FLUCONAZOLE 150 MG/1
TABLET ORAL
COMMUNITY
Start: 2024-05-03

## 2024-05-15 NOTE — PROGRESS NOTES
"        Subjective     Chief Complaint   Patient presents with   • Follow-up       History of Present Illness    Patient presents today for follow up on chest pain.  He went to [facility] yesterday at the advice of this office for chest pain.  He states that his workup was normal with the exception of mild hypocalcemia.  He states that he was advised to take a daily calcium supplement or Tums which she has been doing.  Chest pain comes and goes.  Worse with working and physical exertion but also occurs at rest.  Pain occurs over left chest and radiates across right chest and left shoulder.  Shortness of breath and left jaw pain with chest pain.  He has had episodes of near syncope and nausea with the chest pain. Pain is dull and aching with chest tightness.  Pain lasts about 30 minutes.  Denies alleviating factors but does take slow deep breaths when it occurs.  Patient has cut out energy drinks but is drinking 2-3 sodas a day.  Chest pain does occur after drinking energy drinks but not with sodas.  No correlation with stress or anxiety.  He does have palpitations but not with the chest pain.   He has a  family history of MI and heart disease.      Patient is interested in resuming testosterone injections.  Patient is a transgendered patient female to male.  He has increased depression, increased \"brain fog\", difficulty concentrating, has fatigue.  He is hoping that the testosterone will help with his concentration and with his mood.    He is requesting to start on treatment for his multiple joint and myalgia pain.  He states that the pain is worsening his depression and making it difficult to sleep.    Patient's PMR from outside medical facility reviewed and noted.    Review of Systems     Otherwise complete ROS reviewed and negative except as mentioned in the HPI.    Past Medical History:   Past Medical History:   Diagnosis Date   • ADHD    • Anxiety    • Depression    • Diabetes mellitus    • " Insomnia    • Obesity    • Polycystic ovarian syndrome    • Prediabetes      Past Surgical History:  Past Surgical History:   Procedure Laterality Date   • TONSILLECTOMY       Social History:  reports that he quit smoking about 6 years ago. His smoking use included cigarettes, pipe, and cigars. He started smoking about 11 years ago. He has a 1.3 pack-year smoking history. He has never used smokeless tobacco. He reports that he does not currently use alcohol. He reports that he does not use drugs.    Family History: family history includes Alcohol abuse in his father; Anxiety disorder in his father and mother; Arthritis in his maternal grandmother; Depression in his father and mother; Diabetes in his father and mother; Drug abuse in his father; Early death in his paternal uncle; Fibromyalgia in his maternal grandmother and mother; Hearing loss in his father; Heart attack in his mother; Heart disease in his mother; Kidney disease in his father; Kidney failure in his father; Lupus in his father; Mental illness in his father and mother; Other in his father; Vision loss in his mother.      Allergies:  Allergies   Allergen Reactions   • Cinnamon Swelling   • Silicon Rash     Medications:  Prior to Admission medications    Medication Sig Start Date End Date Taking? Authorizing Provider   ferrous sulfate 324 (65 Fe) MG tablet delayed-release EC tablet Take 1 tablet by mouth Daily With Breakfast. 11/29/23  Yes Karrie Samaniego APRN   metFORMIN (GLUCOPHAGE) 1000 MG tablet Take 1 tablet by mouth 2 (Two) Times a Day With Meals. 8/31/23  Yes Karrie Samaniego APRN   Semaglutide (Rybelsus) 3 MG tablet Take 1 tablet by mouth Daily. 5/1/24  Yes Karrie Samaniego APRN   fluconazole (DIFLUCAN) 150 MG tablet  5/3/24   Provider, MD Alejandra       PHQ-9 Depression Screening  Little interest or pleasure in doing things? 3-->nearly every day   Feeling down, depressed, or hopeless? 2-->more than half the days  "  Trouble falling or staying asleep, or sleeping too much? 2-->more than half the days   Feeling tired or having little energy? 3-->nearly every day   Poor appetite or overeating? 3-->nearly every day   Feeling bad about yourself - or that you are a failure or have let yourself or your family down? 2-->more than half the days   Trouble concentrating on things, such as reading the newspaper or watching television? 3-->nearly every day   Moving or speaking so slowly that other people could have noticed? Or the opposite - being so fidgety or restless that you have been moving around a lot more than usual? 2-->more than half the days   Thoughts that you would be better off dead, or of hurting yourself in some way? 1-->several days   PHQ-9 Total Score 21   If you checked off any problems, how difficult have these problems made it for you to do your work, take care of things at home, or get along with other people? very difficult       PHQ-9 Total Score: 21   20-27 (Severe Depression)  Recommended starting psychotherapy/counseling and Discussed antidepressant therapy    Objective     Vital Signs: /83 (BP Location: Left arm, Patient Position: Sitting, Cuff Size: Adult)   Pulse 75   Temp 97.9 °F (36.6 °C) (Infrared)   Resp 18   Ht 160 cm (63\")   Wt 136 kg (299 lb)   SpO2 98%   BMI 52.97 kg/m²   Physical Exam  Vitals and nursing note reviewed.   Constitutional:       Appearance: He is morbidly obese.   HENT:      Head: Normocephalic.      Nose: Nose normal.      Mouth/Throat:      Mouth: Mucous membranes are moist.   Eyes:      Conjunctiva/sclera: Conjunctivae normal.   Cardiovascular:      Rate and Rhythm: Normal rate and regular rhythm.      Pulses: Normal pulses.      Heart sounds: Normal heart sounds.   Pulmonary:      Effort: Pulmonary effort is normal.      Breath sounds: Normal breath sounds.   Musculoskeletal:         General: Normal range of motion.      Cervical back: Normal range of motion.   Skin:    "  General: Skin is warm and dry.   Neurological:      General: No focal deficit present.      Mental Status: He is alert and oriented to person, place, and time.   Psychiatric:         Mood and Affect: Mood normal.         Behavior: Behavior normal.                  Advance Care Planning            Results Reviewed:  Glucose   Date Value Ref Range Status   05/01/2024 274 (H) 70 - 99 mg/dL Final     BUN   Date Value Ref Range Status   05/01/2024 8 6 - 20 mg/dL Final     Creatinine   Date Value Ref Range Status   05/01/2024 0.64 (L) 0.76 - 1.27 mg/dL Final     Sodium   Date Value Ref Range Status   05/01/2024 136 134 - 144 mmol/L Final     Potassium   Date Value Ref Range Status   05/01/2024 4.1 3.5 - 5.2 mmol/L Final     Chloride   Date Value Ref Range Status   05/01/2024 100 96 - 106 mmol/L Final     Total CO2   Date Value Ref Range Status   05/01/2024 20 20 - 29 mmol/L Final     Calcium   Date Value Ref Range Status   05/01/2024 8.8 8.7 - 10.2 mg/dL Final     ALT (SGPT)   Date Value Ref Range Status   05/01/2024 28 0 - 44 IU/L Final     AST (SGOT)   Date Value Ref Range Status   05/01/2024 19 0 - 40 IU/L Final     WBC   Date Value Ref Range Status   05/01/2024 7.9 3.4 - 10.8 x10E3/uL Final     Hematocrit   Date Value Ref Range Status   05/01/2024 40.7 37.5 - 51.0 % Final     Platelets   Date Value Ref Range Status   05/01/2024 325 150 - 450 x10E3/uL Final     Triglycerides   Date Value Ref Range Status   11/27/2023 68 0 - 149 mg/dL Final     HDL Cholesterol   Date Value Ref Range Status   11/27/2023 71 >39 mg/dL Final     LDL Chol Calc (NIH)   Date Value Ref Range Status   11/27/2023 82 0 - 99 mg/dL Final     Hemoglobin A1C   Date Value Ref Range Status   05/01/2024 9.9 (H) 4.8 - 5.6 % Final     Comment:              Prediabetes: 5.7 - 6.4           Diabetes: >6.4           Glycemic control for adults with diabetes: <7.0     08/29/2023 8.3 % Final         Assessment / Plan     Assessment/Plan     Diagnoses and all  orders for this visit:    1. Chest pain, unspecified type (Primary)  -     Cancel: Adult Transthoracic Echo Complete W/ Cont if Necessary Per Protocol; Future  -     Holter Monitor - 72 Hour Up To 15 Days  -     Ambulatory Referral to Cardiology  -     Adult Transthoracic Echo Complete W/ Cont if Necessary Per Protocol; Future    2. Near syncope  -     Cancel: Adult Transthoracic Echo Complete W/ Cont if Necessary Per Protocol; Future  -     Holter Monitor - 72 Hour Up To 15 Days  -     Ambulatory Referral to Cardiology  -     Adult Transthoracic Echo Complete W/ Cont if Necessary Per Protocol; Future    3. Palpitations  -     Cancel: Adult Transthoracic Echo Complete W/ Cont if Necessary Per Protocol; Future  -     Holter Monitor - 72 Hour Up To 15 Days  -     Ambulatory Referral to Cardiology  -     Adult Transthoracic Echo Complete W/ Cont if Necessary Per Protocol; Future    4. Dyspnea, unspecified type  -     Cancel: Adult Transthoracic Echo Complete W/ Cont if Necessary Per Protocol; Future  -     Holter Monitor - 72 Hour Up To 15 Days  -     Adult Transthoracic Echo Complete W/ Cont if Necessary Per Protocol; Future    5. Multiple joint pain  -     DULoxetine (Cymbalta) 30 MG capsule; Take 1 capsule by mouth Daily.  Dispense: 30 capsule; Refill: 2    6. Severe episode of recurrent major depressive disorder, without psychotic features  -     DULoxetine (Cymbalta) 30 MG capsule; Take 1 capsule by mouth Daily.  Dispense: 30 capsule; Refill: 2    7. Morbid obesity with body mass index of 50.0-59.9 in adult    8. Female-to-male transgender person  -     Ambulatory Referral to Endocrinology             An After Visit Summary was printed and given to the patient at discharge.  Return in about 3 months (around 8/15/2024) for depression and pain.    I have discussed the patient results/orders and plan/recommendation with them at today's visit.      Karrie Samaniego, APRN   05/15/2024

## 2024-05-22 ENCOUNTER — TELEPHONE (OUTPATIENT)
Dept: FAMILY MEDICINE CLINIC | Facility: CLINIC | Age: 29
End: 2024-05-22

## 2024-05-22 NOTE — TELEPHONE ENCOUNTER
Caller: [facility]    Relationship: Provider    Best call back number: [phone]    What is the best time to reach you:     Who are you requesting to speak with (clinical staff, provider,  specific staff member): AUSTEN NAVARRO    Do you know the name of the person who called: CHUN    What was the call regarding: CHUN FROM [facility] IS CALLING TO INFORMED THAT PATIENT HAD TO RESCHEDULE HIS APPOINTMENT TO 06.05.2024 @ 1:00 DUE TO GETTING HIS DAYS MIXED.     PATIENT THOUGHT HIS APPOINTMENT TO GET HIS ECHO CARDIOGRAM WITH CARDIO/PULMONARY WAS NEXT WEEK, BUT IT WAS TODAY AT 2:30

## 2024-05-28 ENCOUNTER — PATIENT MESSAGE (OUTPATIENT)
Dept: FAMILY MEDICINE CLINIC | Facility: CLINIC | Age: 29
End: 2024-05-28
Payer: COMMERCIAL

## 2024-05-28 DIAGNOSIS — R10.13 DYSPEPSIA: ICD-10-CM

## 2024-05-28 DIAGNOSIS — R11.0 NAUSEA: Primary | ICD-10-CM

## 2024-05-28 RX ORDER — FAMOTIDINE 40 MG/1
40 TABLET, FILM COATED ORAL DAILY
Qty: 30 TABLET | Refills: 5 | Status: SHIPPED | OUTPATIENT
Start: 2024-05-28

## 2024-05-28 RX ORDER — ONDANSETRON 4 MG/1
4 TABLET, ORALLY DISINTEGRATING ORAL EVERY 8 HOURS PRN
Qty: 30 TABLET | Refills: 0 | Status: SHIPPED | OUTPATIENT
Start: 2024-05-28

## 2024-06-05 ENCOUNTER — OUTSIDE FACILITY SERVICE (OUTPATIENT)
Dept: CARDIOLOGY | Facility: CLINIC | Age: 29
End: 2024-06-05
Payer: COMMERCIAL

## 2024-06-06 DIAGNOSIS — R00.2 PALPITATIONS: ICD-10-CM

## 2024-06-06 DIAGNOSIS — R55 NEAR SYNCOPE: ICD-10-CM

## 2024-06-06 DIAGNOSIS — R06.00 DYSPNEA, UNSPECIFIED TYPE: ICD-10-CM

## 2024-06-06 DIAGNOSIS — R07.9 CHEST PAIN, UNSPECIFIED TYPE: ICD-10-CM

## 2024-06-14 ENCOUNTER — PATIENT MESSAGE (OUTPATIENT)
Dept: FAMILY MEDICINE CLINIC | Facility: CLINIC | Age: 29
End: 2024-06-14
Payer: COMMERCIAL

## 2024-06-17 ENCOUNTER — DOCUMENTATION (OUTPATIENT)
Dept: CARDIOLOGY | Facility: CLINIC | Age: 29
End: 2024-06-17
Payer: COMMERCIAL

## 2024-06-25 DIAGNOSIS — D16.21 OSTEOCHONDROMA OF FEMUR, RIGHT: ICD-10-CM

## 2024-06-25 DIAGNOSIS — M22.41 CHONDROMALACIA, PATELLA, RIGHT: Primary | ICD-10-CM

## 2024-06-25 DIAGNOSIS — M25.561 ACUTE PAIN OF RIGHT KNEE: ICD-10-CM

## 2024-06-25 DIAGNOSIS — M25.461 EFFUSION OF RIGHT KNEE: ICD-10-CM

## 2024-07-01 DIAGNOSIS — E11.65 TYPE 2 DIABETES MELLITUS WITH HYPERGLYCEMIA, WITHOUT LONG-TERM CURRENT USE OF INSULIN: Chronic | ICD-10-CM

## 2024-07-01 RX ORDER — ORAL SEMAGLUTIDE 3 MG/1
3 TABLET ORAL DAILY
Qty: 30 TABLET | Refills: 0 | Status: CANCELLED | COMMUNITY
Start: 2024-07-01

## 2024-07-01 NOTE — TELEPHONE ENCOUNTER
Pending Prescriptions:                       Disp   Refills    Semaglutide (Rybelsus) 3 MG tablet         30 tab*0        Sig: Take 1 tablet by mouth Daily.

## 2024-07-02 RX ORDER — ORAL SEMAGLUTIDE 7 MG/1
7 TABLET ORAL DAILY
Qty: 30 TABLET | Refills: 2 | Status: SHIPPED | OUTPATIENT
Start: 2024-07-02

## 2024-07-03 ENCOUNTER — OFFICE VISIT (OUTPATIENT)
Dept: FAMILY MEDICINE CLINIC | Facility: CLINIC | Age: 29
End: 2024-07-03
Payer: COMMERCIAL

## 2024-07-03 VITALS
OXYGEN SATURATION: 96 % | HEIGHT: 63 IN | TEMPERATURE: 98 F | DIASTOLIC BLOOD PRESSURE: 82 MMHG | WEIGHT: 293 LBS | RESPIRATION RATE: 18 BRPM | HEART RATE: 80 BPM | BODY MASS INDEX: 51.91 KG/M2 | SYSTOLIC BLOOD PRESSURE: 135 MMHG

## 2024-07-03 DIAGNOSIS — F33.2 SEVERE EPISODE OF RECURRENT MAJOR DEPRESSIVE DISORDER, WITHOUT PSYCHOTIC FEATURES: ICD-10-CM

## 2024-07-03 DIAGNOSIS — E66.01 MORBID OBESITY WITH BODY MASS INDEX OF 50.0-59.9 IN ADULT: Chronic | ICD-10-CM

## 2024-07-03 DIAGNOSIS — E11.65 TYPE 2 DIABETES MELLITUS WITH HYPERGLYCEMIA, WITHOUT LONG-TERM CURRENT USE OF INSULIN: Chronic | ICD-10-CM

## 2024-07-03 DIAGNOSIS — M25.50 MULTIPLE JOINT PAIN: Primary | ICD-10-CM

## 2024-07-03 PROCEDURE — 99214 OFFICE O/P EST MOD 30 MIN: CPT | Performed by: NURSE PRACTITIONER

## 2024-07-03 RX ORDER — DULOXETIN HYDROCHLORIDE 60 MG/1
60 CAPSULE, DELAYED RELEASE ORAL DAILY
Qty: 30 CAPSULE | Refills: 2 | Status: SHIPPED | OUTPATIENT
Start: 2024-07-03

## 2024-07-03 RX ORDER — METFORMIN HYDROCHLORIDE 500 MG/1
2000 TABLET, EXTENDED RELEASE ORAL
Qty: 120 TABLET | Refills: 2 | Status: SHIPPED | OUTPATIENT
Start: 2024-07-03 | End: 2024-10-01

## 2024-07-03 NOTE — PROGRESS NOTES
Subjective     Chief Complaint   Patient presents with   • Referral     Bariatrics   • Med Refill     Metformin         History of Present Illness    Patient presents today for referral to bariatrics to discuss surgical options for weight loss.  Patient weight limits ability to enjoy life fully.  Has pain with movement.  Patient has limited calories, intermittent fasting, high protein diet, low carb diet.  Gets 17,000-20,000 steps a day.  Goes to the gym 3 times a week, walks, hikes, go to the water park twice a week.      Patient needs a refill on metformin.  A1c 2 months ago was 9.9.  Patient is also taking Rybelsus 7 mg.      Wanting increase with cymbalta.  Pain is still present.  Cymbalta has helped with mood.       Patient's PMR from outside medical facility reviewed and noted.    Review of Systems     Otherwise complete ROS reviewed and negative except as mentioned in the HPI.    Past Medical History:   Past Medical History:   Diagnosis Date   • ADHD    • Anxiety    • Depression    • Diabetes mellitus    • Insomnia    • Obesity    • Polycystic ovarian syndrome    • Prediabetes      Past Surgical History:  Past Surgical History:   Procedure Laterality Date   • TONSILLECTOMY       Social History:  reports that he quit smoking about 6 years ago. His smoking use included cigarettes, pipe, and cigars. He started smoking about 11 years ago. He has a 1.3 pack-year smoking history. He has never used smokeless tobacco. He reports that he does not currently use alcohol. He reports that he does not use drugs.    Family History: family history includes Alcohol abuse in his father; Anxiety disorder in his father and mother; Arthritis in his maternal grandmother; Depression in his father and mother; Diabetes in his father and mother; Drug abuse in his father; Early death in his paternal uncle; Fibromyalgia in his maternal grandmother and mother; Hearing loss in his father; Heart attack in his mother; Heart disease  in his mother; Kidney disease in his father; Kidney failure in his father; Lupus in his father; Mental illness in his father and mother; Other in his father; Vision loss in his mother.      Allergies:  Allergies   Allergen Reactions   • Cinnamon Swelling   • Silicon Rash     Medications:  Prior to Admission medications    Medication Sig Start Date End Date Taking? Authorizing Provider   DULoxetine (Cymbalta) 30 MG capsule Take 1 capsule by mouth Daily. 5/15/24  Yes Karrie Samaniego APRN   famotidine (Pepcid) 40 MG tablet Take 1 tablet by mouth Daily. 5/28/24  Yes Karrie Samaniego APRN   ferrous sulfate 324 (65 Fe) MG tablet delayed-release EC tablet Take 1 tablet by mouth Daily With Breakfast. 11/29/23  Yes Karrie Samaniego APRN   metFORMIN (GLUCOPHAGE) 1000 MG tablet Take 1 tablet by mouth 2 (Two) Times a Day With Meals. 8/31/23  Yes Karrie Samaniego APRN   ondansetron ODT (ZOFRAN-ODT) 4 MG disintegrating tablet Place 1 tablet on the tongue Every 8 (Eight) Hours As Needed for Nausea or Vomiting. 5/28/24  Yes Karrie Samaniego APRN   Semaglutide (Rybelsus) 7 MG tablet Take 7 mg by mouth Daily. 7/2/24  Yes Karrie Samaniego APRN       RADHA:        PHQ-9 Depression Screening  Little interest or pleasure in doing things? 3-->nearly every day   Feeling down, depressed, or hopeless? 2-->more than half the days   Trouble falling or staying asleep, or sleeping too much? 2-->more than half the days   Feeling tired or having little energy? 3-->nearly every day   Poor appetite or overeating? 3-->nearly every day   Feeling bad about yourself - or that you are a failure or have let yourself or your family down? 2-->more than half the days   Trouble concentrating on things, such as reading the newspaper or watching television? 3-->nearly every day   Moving or speaking so slowly that other people could have noticed? Or the opposite - being so fidgety or restless that you have  "been moving around a lot more than usual? 2-->more than half the days   Thoughts that you would be better off dead, or of hurting yourself in some way? 1-->several days   PHQ-9 Total Score 21   If you checked off any problems, how difficult have these problems made it for you to do your work, take care of things at home, or get along with other people? very difficult       PHQ-9 Total Score: 21   20-27 (Severe Depression)  Recommended starting psychotherapy/counseling, Recommended continuing psychotherapy/counseling, and Discussed antidepressant therapy    Objective     Vital Signs: /82 (BP Location: Right arm, Patient Position: Sitting, Cuff Size: Adult)   Pulse 80   Temp 98 °F (36.7 °C) (Infrared)   Resp 18   Ht 160 cm (63\")   Wt 133 kg (293 lb)   SpO2 96%   BMI 51.90 kg/m²   Physical Exam  Vitals and nursing note reviewed.   Constitutional:       Appearance: He is morbidly obese.   HENT:      Head: Normocephalic.      Nose: Nose normal.      Mouth/Throat:      Mouth: Mucous membranes are moist.   Eyes:      Conjunctiva/sclera: Conjunctivae normal.   Cardiovascular:      Rate and Rhythm: Normal rate and regular rhythm.      Pulses: Normal pulses.      Heart sounds: Normal heart sounds.   Pulmonary:      Effort: Pulmonary effort is normal.      Breath sounds: Normal breath sounds.   Musculoskeletal:         General: Normal range of motion.      Cervical back: Normal range of motion.   Skin:     General: Skin is warm and dry.   Neurological:      General: No focal deficit present.      Mental Status: He is alert and oriented to person, place, and time.   Psychiatric:         Mood and Affect: Mood normal.         Behavior: Behavior normal.                Advance Care Planning            Results Reviewed:  Glucose   Date Value Ref Range Status   05/01/2024 274 (H) 70 - 99 mg/dL Final     BUN   Date Value Ref Range Status   05/01/2024 8 6 - 20 mg/dL Final     Creatinine   Date Value Ref Range Status "   05/01/2024 0.64 (L) 0.76 - 1.27 mg/dL Final     Sodium   Date Value Ref Range Status   05/01/2024 136 134 - 144 mmol/L Final     Potassium   Date Value Ref Range Status   05/01/2024 4.1 3.5 - 5.2 mmol/L Final     Chloride   Date Value Ref Range Status   05/01/2024 100 96 - 106 mmol/L Final     Total CO2   Date Value Ref Range Status   05/01/2024 20 20 - 29 mmol/L Final     Calcium   Date Value Ref Range Status   05/01/2024 8.8 8.7 - 10.2 mg/dL Final     ALT (SGPT)   Date Value Ref Range Status   05/01/2024 28 0 - 44 IU/L Final     AST (SGOT)   Date Value Ref Range Status   05/01/2024 19 0 - 40 IU/L Final     WBC   Date Value Ref Range Status   05/01/2024 7.9 3.4 - 10.8 x10E3/uL Final     Hematocrit   Date Value Ref Range Status   05/01/2024 40.7 37.5 - 51.0 % Final     Platelets   Date Value Ref Range Status   05/01/2024 325 150 - 450 x10E3/uL Final     Triglycerides   Date Value Ref Range Status   11/27/2023 68 0 - 149 mg/dL Final     HDL Cholesterol   Date Value Ref Range Status   11/27/2023 71 >39 mg/dL Final     LDL Chol Calc (NIH)   Date Value Ref Range Status   11/27/2023 82 0 - 99 mg/dL Final     Hemoglobin A1C   Date Value Ref Range Status   05/01/2024 9.9 (H) 4.8 - 5.6 % Final     Comment:              Prediabetes: 5.7 - 6.4           Diabetes: >6.4           Glycemic control for adults with diabetes: <7.0     08/29/2023 8.3 % Final         Assessment / Plan     Assessment/Plan     Diagnoses and all orders for this visit:    1. Multiple joint pain (Primary)  -     DULoxetine (Cymbalta) 60 MG capsule; Take 1 capsule by mouth Daily.  Dispense: 30 capsule; Refill: 2    2. Severe episode of recurrent major depressive disorder, without psychotic features  -     DULoxetine (Cymbalta) 60 MG capsule; Take 1 capsule by mouth Daily.  Dispense: 30 capsule; Refill: 2    3. Type 2 diabetes mellitus with hyperglycemia, without long-term current use of insulin  -     metFORMIN ER (GLUCOPHAGE-XR) 500 MG 24 hr tablet;  Take 4 tablets by mouth Daily With Breakfast for 90 days.  Dispense: 120 tablet; Refill: 2    4. Morbid obesity with body mass index of 50.0-59.9 in adult  -     Ambulatory Referral to General Surgery               An After Visit Summary was printed and given to the patient at discharge.  Return in about 2 months (around 9/3/2024) for Follow up diabetes .    I have discussed the patient results/orders and plan/recommendation with them at today's visit.      Karrie Samaniego, EMANUEL   07/03/2024

## 2024-07-05 ENCOUNTER — TELEPHONE (OUTPATIENT)
Dept: CASE MANAGEMENT | Facility: OTHER | Age: 29
End: 2024-07-05
Payer: COMMERCIAL

## 2024-07-05 NOTE — TELEPHONE ENCOUNTER
Received call from patient. He stated that he lost his [employer] voucher when there was an outage with [employer]. He was unsure how to obtain a new voucher. [employer] completed online process and the voucher was emailed to patient. He will attempt to fill the medication after work today.

## 2024-07-08 DIAGNOSIS — R11.0 NAUSEA: ICD-10-CM

## 2024-07-08 RX ORDER — ONDANSETRON 4 MG/1
4 TABLET, ORALLY DISINTEGRATING ORAL EVERY 8 HOURS PRN
Qty: 30 TABLET | Refills: 0 | Status: SHIPPED | OUTPATIENT
Start: 2024-07-08

## 2024-07-08 NOTE — TELEPHONE ENCOUNTER
Pending Prescriptions:                       Disp   Refills    ondansetron ODT (ZOFRAN-ODT) 4 MG disinteg*30 tab*0        Sig: DISSOLVE 1 TABLET IN MOUTH EVERY 8 HOURS AS NEEDED           FOR NAUSEA AND VOMITING

## 2024-07-09 ENCOUNTER — TELEPHONE (OUTPATIENT)
Dept: CARDIOLOGY | Facility: CLINIC | Age: 29
End: 2024-07-09
Payer: COMMERCIAL

## 2024-07-09 NOTE — TELEPHONE ENCOUNTER
I called pt inquiring about the unreturned monitor.  He said his girlfriend was supposed to get it sent for him but they had a breakup and he thinks it may be in her car.  She will ask her when he can and if it is in her possession he will try to retrieve it from her and get it sent.  If not he will call his PCP at the [facility] to see if he can get another one to wear again.  As for now the monitor is lost.  I will await his return call before I place it as a lost monitor in the tech list.  Salvatore Gregory, CMA

## 2024-07-15 ENCOUNTER — TELEPHONE (OUTPATIENT)
Dept: CARDIOLOGY | Facility: CLINIC | Age: 29
End: 2024-07-15
Payer: COMMERCIAL

## 2024-07-15 NOTE — TELEPHONE ENCOUNTER
There is no report to read on this pts Holter monitor.  He did not send it in because he gave it to his girlfriend to take to [employer] and right after they broke up.  He is not sure if she took it or not and has not been able to talk to her to find out.  I told pt he will need to contact his pcp that ordered the monitor to see if she wants him to wear it again.  Salvatore Gregory, CMA

## 2024-07-18 ENCOUNTER — TELEPHONE (OUTPATIENT)
Dept: FAMILY MEDICINE CLINIC | Facility: CLINIC | Age: 29
End: 2024-07-18
Payer: COMMERCIAL

## 2024-07-18 NOTE — TELEPHONE ENCOUNTER
Spoke with patient regarding heart monitor. He states it is back in his possession and he will turn it in tomorrow to be mailed. Can you reach out to [employer] and see if she can reinstate the order and give her a heads up that is it coming?

## 2024-07-18 NOTE — TELEPHONE ENCOUNTER
Reached out to [employer] and she is going to call this afternoon to walk me through how to re enroll that serial number

## 2024-07-30 ENCOUNTER — TELEPHONE (OUTPATIENT)
Dept: CARDIOLOGY | Facility: CLINIC | Age: 29
End: 2024-07-30
Payer: COMMERCIAL

## 2024-07-30 DIAGNOSIS — R07.9 CHEST PAIN, UNSPECIFIED TYPE: Primary | ICD-10-CM

## 2024-07-30 DIAGNOSIS — R55 NEAR SYNCOPE: ICD-10-CM

## 2024-07-30 DIAGNOSIS — R00.2 PALPITATIONS: ICD-10-CM

## 2024-07-30 NOTE — TELEPHONE ENCOUNTER
I am pending a order for the heart monitor.  The pt did not send it back in time and the order was closed and monitor was deemed as lost.  I got the report today where he finally sent it in and do not have an order to attach it to now.  Please sign the order so that I can put the results in the chart.  Thank you.  Salvatore Gregory, CMA

## 2024-08-14 ENCOUNTER — OFFICE VISIT (OUTPATIENT)
Dept: FAMILY MEDICINE CLINIC | Facility: CLINIC | Age: 29
End: 2024-08-14
Payer: COMMERCIAL

## 2024-08-14 VITALS
BODY MASS INDEX: 51.38 KG/M2 | HEART RATE: 88 BPM | TEMPERATURE: 98 F | OXYGEN SATURATION: 99 % | HEIGHT: 63 IN | RESPIRATION RATE: 18 BRPM | WEIGHT: 290 LBS | DIASTOLIC BLOOD PRESSURE: 90 MMHG | SYSTOLIC BLOOD PRESSURE: 120 MMHG

## 2024-08-14 DIAGNOSIS — E11.65 TYPE 2 DIABETES MELLITUS WITH HYPERGLYCEMIA, WITHOUT LONG-TERM CURRENT USE OF INSULIN: Primary | Chronic | ICD-10-CM

## 2024-08-14 DIAGNOSIS — M25.50 MULTIPLE JOINT PAIN: ICD-10-CM

## 2024-08-14 DIAGNOSIS — F33.40 RECURRENT MAJOR DEPRESSIVE DISORDER, IN REMISSION: ICD-10-CM

## 2024-08-14 DIAGNOSIS — Z12.4 ENCOUNTER FOR PAPANICOLAOU SMEAR OF CERVIX: ICD-10-CM

## 2024-08-14 DIAGNOSIS — J01.40 ACUTE NON-RECURRENT PANSINUSITIS: ICD-10-CM

## 2024-08-14 DIAGNOSIS — E66.01 MORBID OBESITY WITH BODY MASS INDEX OF 50.0-59.9 IN ADULT: Chronic | ICD-10-CM

## 2024-08-14 DIAGNOSIS — Z78.9 FEMALE-TO-MALE TRANSGENDER PERSON: ICD-10-CM

## 2024-08-14 DIAGNOSIS — M79.10 MYALGIA: ICD-10-CM

## 2024-08-14 LAB
EXPIRATION DATE: ABNORMAL
HBA1C MFR BLD: 8.2 % (ref 4.5–5.7)
Lab: ABNORMAL

## 2024-08-14 PROCEDURE — 83036 HEMOGLOBIN GLYCOSYLATED A1C: CPT | Performed by: NURSE PRACTITIONER

## 2024-08-14 PROCEDURE — 99214 OFFICE O/P EST MOD 30 MIN: CPT | Performed by: NURSE PRACTITIONER

## 2024-08-14 RX ORDER — SEMAGLUTIDE 1.34 MG/ML
INJECTION, SOLUTION SUBCUTANEOUS WEEKLY
Status: CANCELLED | OUTPATIENT
Start: 2024-08-14

## 2024-08-14 RX ORDER — AMOXICILLIN AND CLAVULANATE POTASSIUM 875; 125 MG/1; MG/1
1 TABLET, FILM COATED ORAL 2 TIMES DAILY
Qty: 14 TABLET | Refills: 0 | Status: SHIPPED | OUTPATIENT
Start: 2024-08-14 | End: 2024-08-21

## 2024-08-14 RX ORDER — SEMAGLUTIDE 0.68 MG/ML
0.5 INJECTION, SOLUTION SUBCUTANEOUS WEEKLY
Qty: 3 ML | Refills: 0 | Status: SHIPPED | OUTPATIENT
Start: 2024-08-14 | End: 2024-08-14

## 2024-08-14 RX ORDER — SEMAGLUTIDE 0.68 MG/ML
INJECTION, SOLUTION SUBCUTANEOUS WEEKLY
Status: CANCELLED | OUTPATIENT
Start: 2024-08-14

## 2024-08-14 RX ORDER — CYCLOBENZAPRINE HCL 5 MG
5 TABLET ORAL 3 TIMES DAILY PRN
Qty: 30 TABLET | Refills: 0 | Status: SHIPPED | OUTPATIENT
Start: 2024-08-14

## 2024-08-14 RX ORDER — FLUTICASONE PROPIONATE 50 MCG
2 SPRAY, SUSPENSION (ML) NASAL DAILY
Qty: 16 G | Refills: 5 | Status: SHIPPED | OUTPATIENT
Start: 2024-08-14

## 2024-08-14 NOTE — PROGRESS NOTES
Subjective     Chief Complaint   Patient presents with   • Follow-up       History of Present Illness    Follow up on depression.  Depression is better with increased dose of duloxetine.    Follow up on pain.  Pain is worse.  No improvement with increased duloxetine.  Has appointment with rheumatology next month.  Has recently been sick and that has caused a flare up in pain.  Pain is constant.  Patient is resting on days off.  Seeing ortho next week.  Has flaky skin that develops around mouth related to pain flare ups.      Follow up on diabetes.  Previous A1c was 9.9.  Today A1c is improved at 8.2.  Diabetic eye exam was last done at [facility] this year.  Patient has had problems with vouchers for pill and has had difficulty getting it filled.  States that he does forget to take it some days.      Has sinus pressure, headache, nasal drainage that has been present for 2 weeks.  This is unchanged.  Had a cold last week that is resolved.     Patient's PMR from outside medical facility reviewed and noted.    Review of Systems     Otherwise complete ROS reviewed and negative except as mentioned in the HPI.    Past Medical History:   Past Medical History:   Diagnosis Date   • ADHD    • Anxiety    • Depression    • Diabetes mellitus    • Insomnia    • Obesity    • Polycystic ovarian syndrome    • Prediabetes      Past Surgical History:  Past Surgical History:   Procedure Laterality Date   • TONSILLECTOMY       Social History:  reports that he quit smoking about 6 years ago. His smoking use included cigarettes, pipe, and cigars. He started smoking about 11 years ago. He has a 1.3 pack-year smoking history. He has never used smokeless tobacco. He reports that he does not currently use alcohol. He reports that he does not use drugs.    Family History: family history includes Alcohol abuse in his father; Anxiety disorder in his father and mother; Arthritis in his maternal grandmother; Depression in his father and  mother; Diabetes in his father and mother; Drug abuse in his father; Early death in his paternal uncle; Fibromyalgia in his maternal grandmother and mother; Hearing loss in his father; Heart attack in his mother; Heart disease in his mother; Kidney disease in his father; Kidney failure in his father; Lupus in his father; Mental illness in his father and mother; Other in his father; Vision loss in his mother.      Allergies:  Allergies   Allergen Reactions   • Cinnamon Swelling   • Silicon Rash     Medications:  Prior to Admission medications    Medication Sig Start Date End Date Taking? Authorizing Provider   DULoxetine (Cymbalta) 60 MG capsule Take 1 capsule by mouth Daily. 7/3/24  Yes Karrie Samaniego APRN   famotidine (Pepcid) 40 MG tablet Take 1 tablet by mouth Daily. 5/28/24  Yes Karrie Samaniego APRN   ferrous sulfate 324 (65 Fe) MG tablet delayed-release EC tablet Take 1 tablet by mouth Daily With Breakfast. 11/29/23  Yes Karrie Samaniego APRN   metFORMIN ER (GLUCOPHAGE-XR) 500 MG 24 hr tablet Take 4 tablets by mouth Daily With Breakfast for 90 days. 7/3/24 10/1/24 Yes Karrie Samaniego APRN   ondansetron ODT (ZOFRAN-ODT) 4 MG disintegrating tablet DISSOLVE 1 TABLET IN MOUTH EVERY 8 HOURS AS NEEDED FOR NAUSEA AND VOMITING 7/8/24  Yes Karrie Samaniego APRN   Semaglutide (Rybelsus) 7 MG tablet Take 7 mg by mouth Daily. 7/2/24  Yes Karrie Samaniego APRN       RADHA:        PHQ-9 Depression Screening  Little interest or pleasure in doing things? 0-->not at all   Feeling down, depressed, or hopeless? 0-->not at all   Trouble falling or staying asleep, or sleeping too much?     Feeling tired or having little energy?     Poor appetite or overeating?     Feeling bad about yourself - or that you are a failure or have let yourself or your family down?     Trouble concentrating on things, such as reading the newspaper or watching television?     Moving or speaking so  "slowly that other people could have noticed? Or the opposite - being so fidgety or restless that you have been moving around a lot more than usual?     Thoughts that you would be better off dead, or of hurting yourself in some way?     PHQ-9 Total Score 0   If you checked off any problems, how difficult have these problems made it for you to do your work, take care of things at home, or get along with other people?         PHQ-9 Total Score: 0   0 (Negative screening for depression)  Support given, observe for worsening symptoms    Objective     Vital Signs: /90 (BP Location: Right arm, Patient Position: Sitting, Cuff Size: Adult)   Pulse 88   Temp 98 °F (36.7 °C) (Infrared)   Resp 18   Ht 160 cm (63\")   Wt 132 kg (290 lb)   SpO2 99%   BMI 51.37 kg/m²   Physical Exam  Vitals and nursing note reviewed.   Constitutional:       Appearance: Normal appearance.   HENT:      Head: Normocephalic.      Right Ear: Tympanic membrane normal.      Left Ear: Tympanic membrane is erythematous.      Nose: Congestion present.      Mouth/Throat:      Mouth: Mucous membranes are moist.   Eyes:      Conjunctiva/sclera: Conjunctivae normal.   Cardiovascular:      Rate and Rhythm: Normal rate and regular rhythm.      Pulses: Normal pulses.      Heart sounds: Normal heart sounds.   Pulmonary:      Effort: Pulmonary effort is normal.      Breath sounds: Normal breath sounds.   Musculoskeletal:         General: Normal range of motion.      Cervical back: Normal range of motion.   Skin:     General: Skin is warm and dry.   Neurological:      General: No focal deficit present.      Mental Status: He is alert and oriented to person, place, and time.   Psychiatric:         Mood and Affect: Mood normal.         Behavior: Behavior normal.         Advance Care Planning            Results Reviewed:  Glucose   Date Value Ref Range Status   05/01/2024 274 (H) 70 - 99 mg/dL Final     BUN   Date Value Ref Range Status   05/01/2024 8 6 - 20 " mg/dL Final     Creatinine   Date Value Ref Range Status   05/01/2024 0.64 (L) 0.76 - 1.27 mg/dL Final     Sodium   Date Value Ref Range Status   05/01/2024 136 134 - 144 mmol/L Final     Potassium   Date Value Ref Range Status   05/01/2024 4.1 3.5 - 5.2 mmol/L Final     Chloride   Date Value Ref Range Status   05/01/2024 100 96 - 106 mmol/L Final     Total CO2   Date Value Ref Range Status   05/01/2024 20 20 - 29 mmol/L Final     Calcium   Date Value Ref Range Status   05/01/2024 8.8 8.7 - 10.2 mg/dL Final     ALT (SGPT)   Date Value Ref Range Status   05/01/2024 28 0 - 44 IU/L Final     AST (SGOT)   Date Value Ref Range Status   05/01/2024 19 0 - 40 IU/L Final     WBC   Date Value Ref Range Status   05/01/2024 7.9 3.4 - 10.8 x10E3/uL Final     Hematocrit   Date Value Ref Range Status   05/01/2024 40.7 37.5 - 51.0 % Final     Platelets   Date Value Ref Range Status   05/01/2024 325 150 - 450 x10E3/uL Final     Triglycerides   Date Value Ref Range Status   11/27/2023 68 0 - 149 mg/dL Final     HDL Cholesterol   Date Value Ref Range Status   11/27/2023 71 >39 mg/dL Final     LDL Chol Calc (NIH)   Date Value Ref Range Status   11/27/2023 82 0 - 99 mg/dL Final     Hemoglobin A1C   Date Value Ref Range Status   08/14/2024 8.2 (A) 4.5 - 5.7 % Final         Assessment / Plan     Assessment/Plan     Diagnoses and all orders for this visit:    1. Type 2 diabetes mellitus with hyperglycemia, without long-term current use of insulin (Primary)  -     POC Glycosylated Hemoglobin (Hb A1C)  -     Semaglutide,0.25 or 0.5MG/DOS, (Ozempic, 0.25 or 0.5 MG/DOSE,) 2 MG/3ML solution pen-injector; Inject 0.5 mg under the skin into the appropriate area as directed 1 (One) Time Per Week for 30 days.  Dispense: 3 mL; Refill: 0  - Discontinue Rybelsus due to lack of availability and difficulty with coupon.     -Start patient on Ozempic 0.5 mg subcu weekly.  -Follow-up in 3 months with A1c.  -Diabetes is improved.  Will request last diabetic  eye exam which patient believes was completed within the year.  2. Acute non-recurrent pansinusitis  -     amoxicillin-clavulanate (AUGMENTIN) 875-125 MG per tablet; Take 1 tablet by mouth 2 (Two) Times a Day for 7 days.  Dispense: 14 tablet; Refill: 0  -     fluticasone (FLONASE) 50 MCG/ACT nasal spray; 2 sprays into the nostril(s) as directed by provider Daily.  Dispense: 16 g; Refill: 5    3. Encounter for Papanicolaou smear of cervix  -     Ambulatory Referral to Gynecology    4. Female-to-male transgender person  -     Ambulatory Referral to Gynecology    5. Multiple joint pain   -No improvement in pain with increased dose of duloxetine.  Patient has appointment next week with orthopedic and appointment with rheumatology in 1 month.    6. Myalgia  -     cyclobenzaprine (FLEXERIL) 5 MG tablet; Take 1 tablet by mouth 3 (Three) Times a Day As Needed for Muscle Spasms.  Dispense: 30 tablet; Refill: 0    7. Morbid obesity with body mass index of 50.0-59.9 in adult    8. Depression   Depression is improved with increased dose of duloxetine.  Continue at this current dose and follow-up in 3 months.           An After Visit Summary was printed and given to the patient at discharge.  Return in about 3 months (around 11/14/2024) for Follow up diabetes.    I have discussed the patient results/orders and plan/recommendation with them at today's visit.      Karrie Samaniego, EMANUEL   08/14/2024

## 2024-09-02 DIAGNOSIS — B37.31 VAGINAL YEAST INFECTION: ICD-10-CM

## 2024-09-03 RX ORDER — FLUCONAZOLE 150 MG/1
150 TABLET ORAL
Qty: 1 TABLET | Refills: 0 | OUTPATIENT
Start: 2024-09-03

## 2024-09-03 NOTE — TELEPHONE ENCOUNTER
Pending Prescriptions:                       Disp   Refills    fluconazole (DIFLUCAN) 150 MG tablet [Phar*1 tabl*0        Sig: TAKE ONE TABLET BY MOUTH AS A ONE-TIME DOSE

## 2024-09-28 DIAGNOSIS — R11.0 NAUSEA: ICD-10-CM

## 2024-09-30 RX ORDER — ONDANSETRON 4 MG/1
4 TABLET, ORALLY DISINTEGRATING ORAL EVERY 8 HOURS PRN
Qty: 30 TABLET | Refills: 0 | Status: SHIPPED | OUTPATIENT
Start: 2024-09-30

## 2024-10-25 ENCOUNTER — OFFICE VISIT (OUTPATIENT)
Dept: FAMILY MEDICINE CLINIC | Facility: CLINIC | Age: 29
End: 2024-10-25
Payer: COMMERCIAL

## 2024-10-25 VITALS
HEIGHT: 63 IN | DIASTOLIC BLOOD PRESSURE: 88 MMHG | OXYGEN SATURATION: 99 % | TEMPERATURE: 98 F | SYSTOLIC BLOOD PRESSURE: 131 MMHG | WEIGHT: 302 LBS | RESPIRATION RATE: 18 BRPM | BODY MASS INDEX: 53.51 KG/M2 | HEART RATE: 102 BPM

## 2024-10-25 DIAGNOSIS — E55.9 VITAMIN D DEFICIENCY: ICD-10-CM

## 2024-10-25 DIAGNOSIS — R06.02 SHORTNESS OF BREATH: ICD-10-CM

## 2024-10-25 DIAGNOSIS — E66.01 MORBID OBESITY WITH BODY MASS INDEX OF 50.0-59.9 IN ADULT: Chronic | ICD-10-CM

## 2024-10-25 DIAGNOSIS — J40 BRONCHITIS: Primary | ICD-10-CM

## 2024-10-25 DIAGNOSIS — R05.1 ACUTE COUGH: ICD-10-CM

## 2024-10-25 PROCEDURE — 99213 OFFICE O/P EST LOW 20 MIN: CPT | Performed by: NURSE PRACTITIONER

## 2024-10-25 RX ORDER — ALBUTEROL SULFATE 90 UG/1
2 INHALANT RESPIRATORY (INHALATION)
Qty: 18 G | Refills: 0 | Status: SHIPPED | OUTPATIENT
Start: 2024-10-25

## 2024-10-25 RX ORDER — METHYLPREDNISOLONE 4 MG
TABLET, DOSE PACK ORAL
Qty: 21 TABLET | Refills: 0 | Status: SHIPPED | OUTPATIENT
Start: 2024-10-25

## 2024-10-25 RX ORDER — ERGOCALCIFEROL 1.25 MG/1
50000 CAPSULE, LIQUID FILLED ORAL WEEKLY
Qty: 12 CAPSULE | Refills: 0 | Status: SHIPPED | OUTPATIENT
Start: 2024-10-25 | End: 2025-10-20

## 2024-10-25 RX ORDER — GUAIFENESIN 600 MG/1
1200 TABLET, EXTENDED RELEASE ORAL 2 TIMES DAILY
Qty: 40 TABLET | Refills: 0 | Status: SHIPPED | OUTPATIENT
Start: 2024-10-25 | End: 2024-11-04

## 2024-10-25 RX ORDER — AZITHROMYCIN 250 MG/1
TABLET, FILM COATED ORAL
Qty: 6 TABLET | Refills: 0 | Status: SHIPPED | OUTPATIENT
Start: 2024-10-25

## 2024-10-25 RX ORDER — TRIAMCINOLONE ACETONIDE 40 MG/ML
40 INJECTION, SUSPENSION INTRA-ARTICULAR; INTRAMUSCULAR ONCE
Status: COMPLETED | OUTPATIENT
Start: 2024-10-25 | End: 2024-10-25

## 2024-10-25 RX ADMIN — TRIAMCINOLONE ACETONIDE 40 MG: 40 INJECTION, SUSPENSION INTRA-ARTICULAR; INTRAMUSCULAR at 11:41

## 2024-10-25 NOTE — PROGRESS NOTES
Subjective     Chief Complaint   Patient presents with    URI    Fever    Chills    Generalized Body Aches    Fatigue    Sore Throat    Cough       URI   Associated symptoms include coughing and a sore throat.   Fever   Associated symptoms include coughing and a sore throat.   Chills  Associated symptoms include chills, coughing, fatigue, a fever and a sore throat.   Fatigue  Associated symptoms include chills, coughing, fatigue, a fever and a sore throat.   Sore Throat   Associated symptoms include coughing.   Cough  Associated symptoms include chills, a fever and a sore throat.       Symptoms started 2 weeks ago.  Has a fever, chills, generalized body aches, fatigue, sore throat, productive cough, short of breath, wheezing.  Taking OTC sudafed, cold and flu, sinus relief, naproxen.      Patient's PMR from outside medical facility reviewed and noted.    Review of Systems   Constitutional:  Positive for chills, fatigue and fever.   HENT:  Positive for sore throat.    Respiratory:  Positive for cough.         Otherwise complete ROS reviewed and negative except as mentioned in the HPI.    Past Medical History:   Past Medical History:   Diagnosis Date    ADHD     Anxiety     Depression     Diabetes mellitus     Insomnia     Obesity     Polycystic ovarian syndrome     Prediabetes      Past Surgical History:  Past Surgical History:   Procedure Laterality Date    TONSILLECTOMY       Social History:  reports that he quit smoking about 6 years ago. His smoking use included cigarettes, pipe, and cigars. He started smoking about 11 years ago. He has a 1.3 pack-year smoking history. He has never used smokeless tobacco. He reports that he does not currently use alcohol. He reports that he does not use drugs.    Family History: family history includes Alcohol abuse in his father; Anxiety disorder in his father and mother; Arthritis in his maternal grandmother; Depression in his father and mother; Diabetes in his father  and mother; Drug abuse in his father; Early death in his paternal uncle; Fibromyalgia in his maternal grandmother and mother; Hearing loss in his father; Heart attack in his mother; Heart disease in his mother; Kidney disease in his father; Kidney failure in his father; Lupus in his father; Mental illness in his father and mother; Other in his father; Vision loss in his mother.      Allergies:  Allergies   Allergen Reactions    Cinnamon Swelling    Silicon Rash     Medications:  Prior to Admission medications    Medication Sig Start Date End Date Taking? Authorizing Provider   cyclobenzaprine (FLEXERIL) 5 MG tablet Take 1 tablet by mouth 3 (Three) Times a Day As Needed for Muscle Spasms. 8/14/24  Yes Karrie Samaniego APRN   DULoxetine (Cymbalta) 60 MG capsule Take 1 capsule by mouth Daily. 7/3/24  Yes Karrie Samaniego APRN   famotidine (Pepcid) 40 MG tablet Take 1 tablet by mouth Daily. 5/28/24  Yes Karrie Samaniego APRN   ferrous sulfate 324 (65 Fe) MG tablet delayed-release EC tablet Take 1 tablet by mouth Daily With Breakfast. 11/29/23  Yes Karrie Samaniego APRN   fluticasone (FLONASE) 50 MCG/ACT nasal spray 2 sprays into the nostril(s) as directed by provider Daily. 8/14/24  Yes Karrie Samaniego APRN   ondansetron ODT (ZOFRAN-ODT) 4 MG disintegrating tablet DISSOLVE 1 TABLET IN MOUTH EVERY 8 HOURS AS NEEDED FOR NAUSEA AND VOMITING 9/30/24  Yes Karrie Samaniego APRN   metFORMIN ER (GLUCOPHAGE-XR) 500 MG 24 hr tablet Take 4 tablets by mouth Daily With Breakfast for 90 days. 7/3/24 10/1/24  Karrie Samaniego APRN       RADHA:      PHQ-9 Depression Screening  Little interest or pleasure in doing things? Not at all   Feeling down, depressed, or hopeless? Not at all   PHQ-2 Total Score 0   Trouble falling or staying asleep, or sleeping too much?     Feeling tired or having little energy?     Poor appetite or overeating?     Feeling bad about yourself - or  "that you are a failure or have let yourself or your family down?     Trouble concentrating on things, such as reading the newspaper or watching television?     Moving or speaking so slowly that other people could have noticed? Or the opposite - being so fidgety or restless that you have been moving around a lot more than usual?     Thoughts that you would be better off dead, or of hurting yourself in some way?     PHQ-9 Total Score     If you checked off any problems, how difficult have these problems made it for you to do your work, take care of things at home, or get along with other people?         PHQ-9 Total Score:           Objective     Vital Signs: /88 (BP Location: Right arm, Patient Position: Sitting, Cuff Size: Adult)   Pulse 102   Temp 98 °F (36.7 °C) (Infrared)   Resp 18   Ht 160 cm (63\")   Wt (!) 137 kg (302 lb)   SpO2 99%   BMI 53.50 kg/m²   Physical Exam  Vitals and nursing note reviewed.   Constitutional:       Appearance: He is morbidly obese.   HENT:      Head: Normocephalic.      Right Ear: Tympanic membrane normal.      Left Ear: Tympanic membrane normal.      Nose: Congestion present.      Mouth/Throat:      Pharynx: Posterior oropharyngeal erythema present. No oropharyngeal exudate.   Cardiovascular:      Rate and Rhythm: Normal rate and regular rhythm.      Pulses: Normal pulses.      Heart sounds: Normal heart sounds.   Pulmonary:      Effort: Pulmonary effort is normal.      Breath sounds: Examination of the right-upper field reveals wheezing. Examination of the left-upper field reveals wheezing. Wheezing present.   Musculoskeletal:         General: Normal range of motion.   Skin:     General: Skin is warm and dry.   Neurological:      General: No focal deficit present.      Mental Status: He is alert and oriented to person, place, and time.   Psychiatric:         Mood and Affect: Mood normal.         Behavior: Behavior normal.           Advance Care Planning            Results " Reviewed:  Glucose   Date Value Ref Range Status   05/01/2024 274 (H) 70 - 99 mg/dL Final     BUN   Date Value Ref Range Status   05/01/2024 8 6 - 20 mg/dL Final     Creatinine   Date Value Ref Range Status   05/01/2024 0.64 (L) 0.76 - 1.27 mg/dL Final     Sodium   Date Value Ref Range Status   05/01/2024 136 134 - 144 mmol/L Final     Potassium   Date Value Ref Range Status   05/01/2024 4.1 3.5 - 5.2 mmol/L Final     Chloride   Date Value Ref Range Status   05/01/2024 100 96 - 106 mmol/L Final     Total CO2   Date Value Ref Range Status   05/01/2024 20 20 - 29 mmol/L Final     Calcium   Date Value Ref Range Status   05/01/2024 8.8 8.7 - 10.2 mg/dL Final     ALT (SGPT)   Date Value Ref Range Status   05/01/2024 28 0 - 44 IU/L Final     AST (SGOT)   Date Value Ref Range Status   05/01/2024 19 0 - 40 IU/L Final     WBC   Date Value Ref Range Status   05/01/2024 7.9 3.4 - 10.8 x10E3/uL Final     Hematocrit   Date Value Ref Range Status   05/01/2024 40.7 37.5 - 51.0 % Final     Platelets   Date Value Ref Range Status   05/01/2024 325 150 - 450 x10E3/uL Final     Triglycerides   Date Value Ref Range Status   11/27/2023 68 0 - 149 mg/dL Final     HDL Cholesterol   Date Value Ref Range Status   11/27/2023 71 >39 mg/dL Final     LDL Chol Calc (NIH)   Date Value Ref Range Status   11/27/2023 82 0 - 99 mg/dL Final     Hemoglobin A1C   Date Value Ref Range Status   08/14/2024 8.2 (A) 4.5 - 5.7 % Final         Assessment / Plan     Assessment/Plan     Diagnoses and all orders for this visit:    1. Bronchitis (Primary)  -     triamcinolone acetonide (KENALOG-40) injection 40 mg  -     methylPREDNISolone (MEDROL) 4 MG dose pack; Take as directed on package instructions.  Dispense: 21 tablet; Refill: 0  -     azithromycin (Zithromax Z-Ismael) 250 MG tablet; Take 2 tablets by mouth on day 1, then 1 tablet daily on days 2-5  Dispense: 6 tablet; Refill: 0  -     guaiFENesin (Mucinex) 600 MG 12 hr tablet; Take 2 tablets by mouth 2 (Two)  Times a Day for 10 days.  Dispense: 40 tablet; Refill: 0    2. Acute cough  -     guaiFENesin (Mucinex) 600 MG 12 hr tablet; Take 2 tablets by mouth 2 (Two) Times a Day for 10 days.  Dispense: 40 tablet; Refill: 0    3. Shortness of breath  -     albuterol sulfate  (90 Base) MCG/ACT inhaler; Inhale 2 puffs Every 4 (Four) to 6 (Six) Hours As Needed for Wheezing or Shortness of Air (or persistant cough.).  Dispense: 18 g; Refill: 0    4. Vitamin D deficiency  -     vitamin D (ERGOCALCIFEROL) 1.25 MG (27750 UT) capsule capsule; Take 1 capsule by mouth 1 (One) Time Per Week for 360 days.  Dispense: 12 capsule; Refill: 0  -     Vitamin D,25-Hydroxy; Future    5. Morbid obesity with body mass index of 50.0-59.9 in adult               An After Visit Summary was printed and given to the patient at discharge.  Return for Follow up.    I have discussed the patient results/orders and plan/recommendation with them at today's visit.      Karrie Samaniego, APRN   10/25/2024

## 2024-10-28 ENCOUNTER — TELEPHONE (OUTPATIENT)
Dept: FAMILY MEDICINE CLINIC | Facility: CLINIC | Age: 29
End: 2024-10-28
Payer: COMMERCIAL

## 2024-10-28 NOTE — TELEPHONE ENCOUNTER
Caller: HILARY    Relationship: Other    Best call back number: [phone]     What is the best time to reach you: ANY    Who are you requesting to speak with (clinical staff, provider,  specific staff member): CLINICAL    Do you know the name of the person who called: PRUDENTIAL    What was the call regarding: NEEDS TO CONFIRM FAX WAS RECEIVED ON 10-23-24. WHEN CALLING BACK USE REFERENCE # [id number]

## 2024-12-07 DIAGNOSIS — R11.0 NAUSEA: ICD-10-CM

## 2024-12-09 RX ORDER — ONDANSETRON 4 MG/1
4 TABLET, ORALLY DISINTEGRATING ORAL EVERY 8 HOURS PRN
Qty: 30 TABLET | Refills: 0 | Status: SHIPPED | OUTPATIENT
Start: 2024-12-09

## 2024-12-09 NOTE — TELEPHONE ENCOUNTER
Rx Refill Note  Requested Prescriptions     Pending Prescriptions Disp Refills    ondansetron ODT (ZOFRAN-ODT) 4 MG disintegrating tablet [Pharmacy Med Name: Ondansetron 4 MG Oral Tablet Disintegrating] 30 tablet 0     Sig: DISSOLVE 1 TABLET IN MOUTH EVERY 8 HOURS AS NEEDED FOR NAUSEA AND VOMITING      Last office visit with prescribing clinician: 10/25/2024   Last telemedicine visit with prescribing clinician: Visit date not found   Next office visit with prescribing clinician: Visit date not found                         Would you like a call back once the refill request has been completed: [] Yes [] No    If the office needs to give you a call back, can they leave a voicemail: [] Yes [] No    Judi Patel RN  12/09/24, 07:25 CST

## 2024-12-09 NOTE — TELEPHONE ENCOUNTER
Pt just started a new job and cannot take off any days for the next couple of weeks for sure.  He will call and schedule when he can :)

## 2024-12-18 ENCOUNTER — OFFICE VISIT (OUTPATIENT)
Dept: FAMILY MEDICINE CLINIC | Facility: CLINIC | Age: 29
End: 2024-12-18
Payer: COMMERCIAL

## 2024-12-18 VITALS
WEIGHT: 305 LBS | SYSTOLIC BLOOD PRESSURE: 154 MMHG | BODY MASS INDEX: 54.04 KG/M2 | DIASTOLIC BLOOD PRESSURE: 97 MMHG | RESPIRATION RATE: 16 BRPM | OXYGEN SATURATION: 98 % | HEART RATE: 82 BPM | TEMPERATURE: 98.4 F | HEIGHT: 63 IN

## 2024-12-18 DIAGNOSIS — K52.9 GASTROENTERITIS: ICD-10-CM

## 2024-12-18 DIAGNOSIS — H65.02 NON-RECURRENT ACUTE SEROUS OTITIS MEDIA OF LEFT EAR: ICD-10-CM

## 2024-12-18 DIAGNOSIS — R11.2 NAUSEA AND VOMITING, UNSPECIFIED VOMITING TYPE: Primary | ICD-10-CM

## 2024-12-18 DIAGNOSIS — E66.01 MORBID OBESITY WITH BODY MASS INDEX OF 50.0-59.9 IN ADULT: Chronic | ICD-10-CM

## 2024-12-18 PROCEDURE — 99213 OFFICE O/P EST LOW 20 MIN: CPT | Performed by: NURSE PRACTITIONER

## 2024-12-18 RX ORDER — PROMETHAZINE HYDROCHLORIDE 25 MG/ML
12.5 INJECTION, SOLUTION INTRAMUSCULAR; INTRAVENOUS ONCE
Status: COMPLETED | OUTPATIENT
Start: 2024-12-18 | End: 2024-12-18

## 2024-12-18 RX ORDER — AZITHROMYCIN 250 MG/1
TABLET, FILM COATED ORAL
Qty: 6 TABLET | Refills: 0 | Status: SHIPPED | OUTPATIENT
Start: 2024-12-18

## 2024-12-18 RX ADMIN — PROMETHAZINE HYDROCHLORIDE 12.5 MG: 25 INJECTION, SOLUTION INTRAMUSCULAR; INTRAVENOUS at 08:24

## 2024-12-18 NOTE — LETTER
December 18, 2024     Patient: Elias Schroeder   YOB: 1995   Date of Visit: 12/18/2024       To Whom It May Concern:    It is my medical opinion that Elias Schroeder may return to work on 12/20/24 .  Please excuse Elias from work 12/18/24 and 12/19/24.             Sincerely,        Karrie Samaniego APRN

## 2024-12-18 NOTE — PROGRESS NOTES
Subjective     Chief Complaint   Patient presents with    Vomiting     N/V for two days    Headache       History of Present Illness    Nausea and vomiting and diarrhea and abdominal pain for 2 days.  Headache and sinus pressure and bilateral ear fullness.  Patient is needing a work excuse.  Has not been able to keep much food down but is keeping liquid down.  Vomited zofran last night.      Patient's PMR from outside medical facility reviewed and noted.    Review of Systems     Otherwise complete ROS reviewed and negative except as mentioned in the HPI.    Past Medical History:   Past Medical History:   Diagnosis Date    ADHD     Anxiety     Depression     Diabetes mellitus     Insomnia     Obesity     Polycystic ovarian syndrome     Prediabetes      Past Surgical History:  Past Surgical History:   Procedure Laterality Date    TONSILLECTOMY       Social History:  reports that he quit smoking about 6 years ago. His smoking use included cigarettes, pipe, and cigars. He started smoking about 11 years ago. He has a 1.3 pack-year smoking history. He has been exposed to tobacco smoke. He has never used smokeless tobacco. He reports that he does not currently use alcohol. He reports that he does not use drugs.    Family History: family history includes Alcohol abuse in his father; Anxiety disorder in his father and mother; Arthritis in his maternal grandmother; Depression in his father and mother; Diabetes in his father and mother; Drug abuse in his father; Early death in his paternal uncle; Fibromyalgia in his maternal grandmother and mother; Hearing loss in his father; Heart attack in his mother; Heart disease in his mother; Kidney disease in his father; Kidney failure in his father; Lupus in his father; Mental illness in his father and mother; Other in his father; Vision loss in his mother.      Allergies:  Allergies   Allergen Reactions    Cinnamon Swelling    Silicon Rash     Medications:  Prior to Admission  medications    Medication Sig Start Date End Date Taking? Authorizing Provider   albuterol sulfate  (90 Base) MCG/ACT inhaler Inhale 2 puffs Every 4 (Four) to 6 (Six) Hours As Needed for Wheezing or Shortness of Air (or persistant cough.). 10/25/24  Yes Karrie Samaniego APRN   cyclobenzaprine (FLEXERIL) 5 MG tablet Take 1 tablet by mouth 3 (Three) Times a Day As Needed for Muscle Spasms. 8/14/24  Yes Karrie Samaniego APRN   DULoxetine (Cymbalta) 60 MG capsule Take 1 capsule by mouth Daily. 7/3/24  Yes Karrie Samaniego APRN   famotidine (Pepcid) 40 MG tablet Take 1 tablet by mouth Daily. 5/28/24  Yes Karrie Samaniego APRN   ferrous sulfate 324 (65 Fe) MG tablet delayed-release EC tablet Take 1 tablet by mouth Daily With Breakfast. 11/29/23  Yes Karrie Samaniego APRN   fluticasone (FLONASE) 50 MCG/ACT nasal spray 2 sprays into the nostril(s) as directed by provider Daily. 8/14/24  Yes Karrie Samaniego APRN   ondansetron ODT (ZOFRAN-ODT) 4 MG disintegrating tablet DISSOLVE 1 TABLET IN MOUTH EVERY 8 HOURS AS NEEDED FOR NAUSEA AND VOMITING 12/9/24  Yes Karrie Samaniego APRN   vitamin D (ERGOCALCIFEROL) 1.25 MG (61371 UT) capsule capsule Take 1 capsule by mouth 1 (One) Time Per Week for 360 days. 10/25/24 10/20/25 Yes Karrie Samaniego APRN   azithromycin (Zithromax Z-Ismael) 250 MG tablet Take 2 tablets by mouth on day 1, then 1 tablet daily on days 2-5 10/25/24   Karrie Samaniego APRN   metFORMIN ER (GLUCOPHAGE-XR) 500 MG 24 hr tablet Take 4 tablets by mouth Daily With Breakfast for 90 days. 7/3/24 10/1/24  Karrie Samaniego APRN   methylPREDNISolone (MEDROL) 4 MG dose pack Take as directed on package instructions. 10/25/24   Karrie Samaniego APRN     Objective     Vital Signs: /97 (BP Location: Right arm, Patient Position: Sitting, Cuff Size: Adult)   Pulse 82   Temp 98.4 °F (36.9 °C) (Infrared)   Resp 16   Ht  "160 cm (63\")   Wt (!) 138 kg (305 lb)   SpO2 98%   BMI 54.03 kg/m²   Physical Exam  Vitals and nursing note reviewed.   Constitutional:       Appearance: Normal appearance. He is morbidly obese.   HENT:      Head: Normocephalic.      Right Ear: Tympanic membrane normal.      Left Ear: Tympanic membrane is erythematous and bulging.      Mouth/Throat:      Pharynx: No posterior oropharyngeal erythema.   Cardiovascular:      Rate and Rhythm: Normal rate and regular rhythm.      Pulses: Normal pulses.      Heart sounds: Normal heart sounds.   Pulmonary:      Effort: Pulmonary effort is normal.      Breath sounds: Normal breath sounds.   Abdominal:      General: Bowel sounds are normal.      Palpations: Abdomen is soft.   Musculoskeletal:         General: Normal range of motion.   Skin:     General: Skin is warm and dry.   Neurological:      General: No focal deficit present.      Mental Status: He is alert and oriented to person, place, and time.   Psychiatric:         Mood and Affect: Mood normal.         Behavior: Behavior normal.           Advance Care Planning            Results Reviewed:  Glucose   Date Value Ref Range Status   05/01/2024 274 (H) 70 - 99 mg/dL Final     BUN   Date Value Ref Range Status   05/01/2024 8 6 - 20 mg/dL Final     Creatinine   Date Value Ref Range Status   05/01/2024 0.64 (L) 0.76 - 1.27 mg/dL Final     Sodium   Date Value Ref Range Status   05/01/2024 136 134 - 144 mmol/L Final     Potassium   Date Value Ref Range Status   05/01/2024 4.1 3.5 - 5.2 mmol/L Final     Chloride   Date Value Ref Range Status   05/01/2024 100 96 - 106 mmol/L Final     Total CO2   Date Value Ref Range Status   05/01/2024 20 20 - 29 mmol/L Final     Calcium   Date Value Ref Range Status   05/01/2024 8.8 8.7 - 10.2 mg/dL Final     ALT (SGPT)   Date Value Ref Range Status   05/01/2024 28 0 - 44 IU/L Final     AST (SGOT)   Date Value Ref Range Status   05/01/2024 19 0 - 40 IU/L Final     WBC   Date Value Ref " Range Status   05/01/2024 7.9 3.4 - 10.8 x10E3/uL Final     Hematocrit   Date Value Ref Range Status   05/01/2024 40.7 37.5 - 51.0 % Final     Platelets   Date Value Ref Range Status   05/01/2024 325 150 - 450 x10E3/uL Final     Triglycerides   Date Value Ref Range Status   11/27/2023 68 0 - 149 mg/dL Final     HDL Cholesterol   Date Value Ref Range Status   11/27/2023 71 >39 mg/dL Final     LDL Chol Calc (NIH)   Date Value Ref Range Status   11/27/2023 82 0 - 99 mg/dL Final     Hemoglobin A1C   Date Value Ref Range Status   08/14/2024 8.2 (A) 4.5 - 5.7 % Final         Assessment / Plan     Assessment/Plan     Diagnoses and all orders for this visit:    1. Nausea and vomiting, unspecified vomiting type (Primary)  -     promethazine (PHENERGAN) injection 12.5 mg    2. Non-recurrent acute serous otitis media of left ear  -     azithromycin (Zithromax Z-Ismael) 250 MG tablet; Take 2 tablets by mouth on day 1, then 1 tablet daily on days 2-5  Dispense: 6 tablet; Refill: 0    3. Gastroenteritis  -     azithromycin (Zithromax Z-Ismael) 250 MG tablet; Take 2 tablets by mouth on day 1, then 1 tablet daily on days 2-5  Dispense: 6 tablet; Refill: 0    4. Morbid obesity with body mass index of 50.0-59.9 in adult               An After Visit Summary was printed and given to the patient at discharge.  Return in 2 months (on 2/18/2025) for Annual physical.    I have discussed the patient results/orders and plan/recommendation with them at today's visit.      Karrie Samaniego, APRN   12/18/2024

## 2025-01-13 ENCOUNTER — OFFICE VISIT (OUTPATIENT)
Dept: FAMILY MEDICINE CLINIC | Facility: CLINIC | Age: 30
End: 2025-01-13
Payer: COMMERCIAL

## 2025-01-13 VITALS
RESPIRATION RATE: 16 BRPM | TEMPERATURE: 98.7 F | HEIGHT: 63 IN | HEART RATE: 91 BPM | DIASTOLIC BLOOD PRESSURE: 92 MMHG | WEIGHT: 305 LBS | OXYGEN SATURATION: 98 % | BODY MASS INDEX: 54.04 KG/M2 | SYSTOLIC BLOOD PRESSURE: 152 MMHG

## 2025-01-13 DIAGNOSIS — R14.0 BLOATING: ICD-10-CM

## 2025-01-13 DIAGNOSIS — R11.2 NAUSEA AND VOMITING, UNSPECIFIED VOMITING TYPE: Primary | ICD-10-CM

## 2025-01-13 DIAGNOSIS — R10.84 GENERALIZED ABDOMINAL PAIN: ICD-10-CM

## 2025-01-13 DIAGNOSIS — R19.8 ABDOMINAL FULLNESS: ICD-10-CM

## 2025-01-13 PROCEDURE — 99214 OFFICE O/P EST MOD 30 MIN: CPT | Performed by: NURSE PRACTITIONER

## 2025-01-13 NOTE — PROGRESS NOTES
Subjective     Chief Complaint   Patient presents with    Vomiting     Needing a return to work form filled out, was sent home from work last Wed and symptoms had resolved within 24 hours    Cough    Medication Problem     States the pharmacy has discontinued the vitamin d medication currently taking    GI Problem     States having issues similar to gastroparesis, reason for stopping Ozempic        Vomiting   Associated symptoms include coughing.   Cough    GI Problem  The primary symptoms include vomiting.       Patient presents today needing to have return to work form completed.  Patient was sent home from work on Wednesday with nausea and vomiting and symptoms resolved within 24 hours.      Symptoms of gastroparesis for 2 months.  Patient is constantly gassy, bloating, vomiting, abdominal tenderness and cramping.  Nausea is constant.  Has a bowel movement twice a day.  Eating smaller portions due to symptoms.  Patient stopped Rybelsus and Ozempic and symptoms have improved, but are still present and constant.    Has done gastric bypass initial appointment with Dr. Bruce.  Diet is lactose free.  Denies issue with gluten.  Patient is taking famotidine 40 mg daily.      Patient's PMR from outside medical facility reviewed and noted.    Review of Systems   Respiratory:  Positive for cough.    Gastrointestinal:  Positive for vomiting.        Otherwise complete ROS reviewed and negative except as mentioned in the HPI.    Past Medical History:   Past Medical History:   Diagnosis Date    ADHD     Anxiety     Depression     Diabetes mellitus     Insomnia     Obesity     Polycystic ovarian syndrome     Prediabetes      Past Surgical History:  Past Surgical History:   Procedure Laterality Date    TONSILLECTOMY       Social History:  reports that he quit smoking about 7 years ago. His smoking use included cigarettes, pipe, and cigars. He started smoking about 12 years ago. He has a 1.3 pack-year smoking history. He  has been exposed to tobacco smoke. He has never used smokeless tobacco. He reports that he does not currently use alcohol. He reports current drug use. Drug: Marijuana.    Family History: family history includes Alcohol abuse in his father; Anxiety disorder in his father and mother; Arthritis in his maternal grandmother; Depression in his father and mother; Diabetes in his father and mother; Drug abuse in his father; Early death in his paternal uncle; Fibromyalgia in his maternal grandmother and mother; Hearing loss in his father; Heart attack in his mother; Heart disease in his mother; Kidney disease in his father; Kidney failure in his father; Lupus in his father; Mental illness in his father and mother; Other in his father; Vision loss in his mother.      Allergies:  Allergies   Allergen Reactions    Cinnamon Swelling    Latex Rash    Silicon Rash     Medications:  Prior to Admission medications    Medication Sig Start Date End Date Taking? Authorizing Provider   albuterol sulfate  (90 Base) MCG/ACT inhaler Inhale 2 puffs Every 4 (Four) to 6 (Six) Hours As Needed for Wheezing or Shortness of Air (or persistant cough.). 10/25/24  Yes Karrie Samaniego APRN   cyclobenzaprine (FLEXERIL) 5 MG tablet Take 1 tablet by mouth 3 (Three) Times a Day As Needed for Muscle Spasms. 8/14/24  Yes Karrie Samaniego APRN   DULoxetine (Cymbalta) 60 MG capsule Take 1 capsule by mouth Daily. 7/3/24  Yes Karrie Samaniego APRN   famotidine (Pepcid) 40 MG tablet Take 1 tablet by mouth Daily. 5/28/24  Yes Karrie Samaniego APRN   ferrous sulfate 324 (65 Fe) MG tablet delayed-release EC tablet Take 1 tablet by mouth Daily With Breakfast. 11/29/23  Yes Karrie Samaniego APRN   fluticasone (FLONASE) 50 MCG/ACT nasal spray 2 sprays into the nostril(s) as directed by provider Daily. 8/14/24  Yes Karrie Samaniego APRN   ondansetron ODT (ZOFRAN-ODT) 4 MG disintegrating tablet DISSOLVE 1  "TABLET IN MOUTH EVERY 8 HOURS AS NEEDED FOR NAUSEA AND VOMITING 12/9/24  Yes Karrie Samaniego APRN   vitamin D (ERGOCALCIFEROL) 1.25 MG (67711 UT) capsule capsule Take 1 capsule by mouth 1 (One) Time Per Week for 360 days. 10/25/24 10/20/25 Yes Karrie Samaniego APRN   azithromycin (Zithromax Z-Ismael) 250 MG tablet Take 2 tablets by mouth on day 1, then 1 tablet daily on days 2-5 12/18/24   Karrie Samaniego APRN   metFORMIN ER (GLUCOPHAGE-XR) 500 MG 24 hr tablet Take 4 tablets by mouth Daily With Breakfast for 90 days.  Patient not taking: Reported on 1/13/2025 7/3/24 10/1/24  Karrie Samaniego APRN       Objective     Vital Signs: /92 (BP Location: Right arm, Patient Position: Sitting, Cuff Size: Large Adult)   Pulse 91   Temp 98.7 °F (37.1 °C) (Infrared)   Resp 16   Ht 160 cm (63\")   Wt (!) 138 kg (305 lb)   SpO2 98%   BMI 54.03 kg/m²   Physical Exam  Vitals and nursing note reviewed.   Constitutional:       Appearance: Normal appearance. He is morbidly obese.   HENT:      Head: Normocephalic.   Cardiovascular:      Rate and Rhythm: Normal rate and regular rhythm.      Pulses: Normal pulses.      Heart sounds: Normal heart sounds.   Pulmonary:      Effort: Pulmonary effort is normal.      Breath sounds: Normal breath sounds.   Abdominal:      General: Bowel sounds are normal.      Palpations: Abdomen is soft.      Tenderness: There is no abdominal tenderness.   Musculoskeletal:         General: Normal range of motion.   Skin:     General: Skin is warm and dry.   Neurological:      General: No focal deficit present.      Mental Status: He is alert and oriented to person, place, and time.   Psychiatric:         Mood and Affect: Mood normal.         Behavior: Behavior normal.         Advance Care Planning            Results Reviewed:  Glucose   Date Value Ref Range Status   05/01/2024 274 (H) 70 - 99 mg/dL Final     BUN   Date Value Ref Range Status   05/01/2024 8 6 - 20 " mg/dL Final     Creatinine   Date Value Ref Range Status   05/01/2024 0.64 (L) 0.76 - 1.27 mg/dL Final     Sodium   Date Value Ref Range Status   05/01/2024 136 134 - 144 mmol/L Final     Potassium   Date Value Ref Range Status   05/01/2024 4.1 3.5 - 5.2 mmol/L Final     Chloride   Date Value Ref Range Status   05/01/2024 100 96 - 106 mmol/L Final     Total CO2   Date Value Ref Range Status   05/01/2024 20 20 - 29 mmol/L Final     Calcium   Date Value Ref Range Status   05/01/2024 8.8 8.7 - 10.2 mg/dL Final     ALT (SGPT)   Date Value Ref Range Status   05/01/2024 28 0 - 44 IU/L Final     AST (SGOT)   Date Value Ref Range Status   05/01/2024 19 0 - 40 IU/L Final     WBC   Date Value Ref Range Status   05/01/2024 7.9 3.4 - 10.8 x10E3/uL Final     Hematocrit   Date Value Ref Range Status   05/01/2024 40.7 37.5 - 51.0 % Final     Platelets   Date Value Ref Range Status   05/01/2024 325 150 - 450 x10E3/uL Final     Triglycerides   Date Value Ref Range Status   11/27/2023 68 0 - 149 mg/dL Final     HDL Cholesterol   Date Value Ref Range Status   11/27/2023 71 >39 mg/dL Final     LDL Chol Calc (NIH)   Date Value Ref Range Status   11/27/2023 82 0 - 99 mg/dL Final     Hemoglobin A1C   Date Value Ref Range Status   08/14/2024 8.2 (A) 4.5 - 5.7 % Final         Assessment / Plan     Assessment/Plan     Diagnoses and all orders for this visit:    1. Nausea and vomiting, unspecified vomiting type (Primary)  -     NM Gastric Emptying; Future    2. Bloating  -     NM Gastric Emptying; Future    3. Generalized abdominal pain  -     NM Gastric Emptying; Future    4. Abdominal fullness  -     NM Gastric Emptying; Future    Encouraged to continue with food journal and avoid aggravating foods.  Eat small meals and do not drink liquids with meals.             An After Visit Summary was printed and given to the patient at discharge.  Return in 4 weeks (on 2/10/2025) for Annual physical.    I have discussed the patient results/orders and  plan/recommendation with them at today's visit.      Karrie Samaniego, EMANUEL   01/13/2025

## 2025-01-17 ENCOUNTER — OFFICE VISIT (OUTPATIENT)
Dept: FAMILY MEDICINE CLINIC | Facility: CLINIC | Age: 30
End: 2025-01-17

## 2025-01-17 VITALS
HEART RATE: 58 BPM | DIASTOLIC BLOOD PRESSURE: 70 MMHG | HEIGHT: 63 IN | WEIGHT: 306.6 LBS | BODY MASS INDEX: 54.32 KG/M2 | OXYGEN SATURATION: 99 % | TEMPERATURE: 98.7 F | SYSTOLIC BLOOD PRESSURE: 104 MMHG

## 2025-01-17 DIAGNOSIS — M25.572 PAIN AND SWELLING OF LEFT ANKLE: Primary | ICD-10-CM

## 2025-01-17 DIAGNOSIS — E66.01 MORBID OBESITY WITH BODY MASS INDEX OF 50.0-59.9 IN ADULT: Chronic | ICD-10-CM

## 2025-01-17 DIAGNOSIS — M25.472 PAIN AND SWELLING OF LEFT ANKLE: Primary | ICD-10-CM

## 2025-01-17 PROCEDURE — 99213 OFFICE O/P EST LOW 20 MIN: CPT | Performed by: NURSE PRACTITIONER

## 2025-01-17 RX ORDER — ARM BRACE
1 EACH MISCELLANEOUS AS NEEDED
Qty: 1 EACH | Refills: 0 | Status: SHIPPED | OUTPATIENT
Start: 2025-01-17

## 2025-01-17 NOTE — PROGRESS NOTES
Subjective     Chief Complaint   Patient presents with    Ankle Injury     Happened  30 hrs ago- heard a snap   Left          History of Present Illness    Left ankle pain and audible snap with sudden stop and turn while at work.  Pain has caused nausea.  Happened 2 days ago.  He is able to stand and walk on it.  Taking naproxen and icing.  It did swell initially but has decreased.  Pain is constant, 6/10 currently, was 8/10 yesterday.  Naproxen and ice do help with pain.      Patient's PMR from outside medical facility reviewed and noted.    Review of Systems     Otherwise complete ROS reviewed and negative except as mentioned in the HPI.    Past Medical History:   Past Medical History:   Diagnosis Date    ADHD     Anxiety     Depression     Diabetes mellitus     Insomnia     Obesity     Polycystic ovarian syndrome     Prediabetes      Past Surgical History:  Past Surgical History:   Procedure Laterality Date    TONSILLECTOMY       Social History:  reports that he quit smoking about 7 years ago. His smoking use included cigarettes, pipe, and cigars. He started smoking about 12 years ago. He has a 1.3 pack-year smoking history. He has been exposed to tobacco smoke. He has never used smokeless tobacco. He reports that he does not currently use alcohol. He reports current drug use. Drug: Marijuana.    Family History: family history includes Alcohol abuse in his father; Anxiety disorder in his father and mother; Arthritis in his maternal grandmother; Depression in his father and mother; Diabetes in his father and mother; Drug abuse in his father; Early death in his paternal uncle; Fibromyalgia in his maternal grandmother and mother; Hearing loss in his father; Heart attack in his mother; Heart disease in his mother; Kidney disease in his father; Kidney failure in his father; Lupus in his father; Mental illness in his father and mother; Other in his father; Vision loss in his mother.      Allergies:  Allergies  "  Allergen Reactions    Cinnamon Swelling    Latex Rash    Silicon Rash     Medications:  Prior to Admission medications    Medication Sig Start Date End Date Taking? Authorizing Provider   albuterol sulfate  (90 Base) MCG/ACT inhaler Inhale 2 puffs Every 4 (Four) to 6 (Six) Hours As Needed for Wheezing or Shortness of Air (or persistant cough.). 10/25/24  Yes Karrie Samaniego APRN   cyclobenzaprine (FLEXERIL) 5 MG tablet Take 1 tablet by mouth 3 (Three) Times a Day As Needed for Muscle Spasms. 8/14/24  Yes Karrie Samaniego APRN   DULoxetine (Cymbalta) 60 MG capsule Take 1 capsule by mouth Daily. 7/3/24  Yes Karrie Samaniego APRN   famotidine (Pepcid) 40 MG tablet Take 1 tablet by mouth Daily. 5/28/24  Yes Karrie Samaniego APRN   ferrous sulfate 324 (65 Fe) MG tablet delayed-release EC tablet Take 1 tablet by mouth Daily With Breakfast. 11/29/23  Yes Karrie Samaniego APRN   ondansetron ODT (ZOFRAN-ODT) 4 MG disintegrating tablet DISSOLVE 1 TABLET IN MOUTH EVERY 8 HOURS AS NEEDED FOR NAUSEA AND VOMITING 12/9/24  Yes Karrie Samaniego APRN   fluticasone (FLONASE) 50 MCG/ACT nasal spray 2 sprays into the nostril(s) as directed by provider Daily.  Patient not taking: Reported on 1/17/2025 8/14/24   Karrie Samaniego APRN   vitamin D (ERGOCALCIFEROL) 1.25 MG (44290 UT) capsule capsule Take 1 capsule by mouth 1 (One) Time Per Week for 360 days.  Patient not taking: Reported on 1/17/2025 10/25/24 10/20/25  Karrie Samaniego APRN       Objective     Vital Signs: /70 (BP Location: Right arm, Patient Position: Sitting, Cuff Size: Large Adult)   Pulse 58   Temp 98.7 °F (37.1 °C) (Temporal)   Ht 160 cm (62.99\")   Wt (!) 139 kg (306 lb 9.6 oz)   SpO2 99%   BMI 54.33 kg/m²   Physical Exam  Vitals and nursing note reviewed.   Constitutional:       Appearance: He is morbidly obese.   HENT:      Head: Normocephalic.   Cardiovascular:      Rate " and Rhythm: Normal rate and regular rhythm.      Pulses: Normal pulses.      Heart sounds: Normal heart sounds.   Pulmonary:      Effort: Pulmonary effort is normal.      Breath sounds: Normal breath sounds.   Musculoskeletal:         General: Normal range of motion.      Left ankle: Swelling (trace) present. Tenderness present. Normal range of motion. Anterior drawer test negative.   Skin:     General: Skin is warm and dry.   Neurological:      General: No focal deficit present.      Mental Status: He is alert and oriented to person, place, and time.   Psychiatric:         Mood and Affect: Mood normal.         Behavior: Behavior normal.              Results Reviewed:  Glucose   Date Value Ref Range Status   05/01/2024 274 (H) 70 - 99 mg/dL Final     BUN   Date Value Ref Range Status   05/01/2024 8 6 - 20 mg/dL Final     Creatinine   Date Value Ref Range Status   05/01/2024 0.64 (L) 0.76 - 1.27 mg/dL Final     Sodium   Date Value Ref Range Status   05/01/2024 136 134 - 144 mmol/L Final     Potassium   Date Value Ref Range Status   05/01/2024 4.1 3.5 - 5.2 mmol/L Final     Chloride   Date Value Ref Range Status   05/01/2024 100 96 - 106 mmol/L Final     Total CO2   Date Value Ref Range Status   05/01/2024 20 20 - 29 mmol/L Final     Calcium   Date Value Ref Range Status   05/01/2024 8.8 8.7 - 10.2 mg/dL Final     ALT (SGPT)   Date Value Ref Range Status   05/01/2024 28 0 - 44 IU/L Final     AST (SGOT)   Date Value Ref Range Status   05/01/2024 19 0 - 40 IU/L Final     WBC   Date Value Ref Range Status   05/01/2024 7.9 3.4 - 10.8 x10E3/uL Final     Hematocrit   Date Value Ref Range Status   05/01/2024 40.7 37.5 - 51.0 % Final     Platelets   Date Value Ref Range Status   05/01/2024 325 150 - 450 x10E3/uL Final     Triglycerides   Date Value Ref Range Status   11/27/2023 68 0 - 149 mg/dL Final     HDL Cholesterol   Date Value Ref Range Status   11/27/2023 71 >39 mg/dL Final     LDL Chol Calc ([facility] [[email or web address]])   Date Value Ref  Range Status   11/27/2023 82 0 - 99 mg/dL Final     Hemoglobin A1C   Date Value Ref Range Status   08/14/2024 8.2 (A) 4.5 - 5.7 % Final            Assessment / Plan     Assessment/Plan     Diagnoses and all orders for this visit:    1. Pain and swelling of left ankle (Primary)  -     XR Ankle 3+ View Left; Future  -     Elastic Bandages & Supports (ACE Ankle Brace) misc; Use 1 Device As Needed (ankle pain and swelling).  Dispense: 1 each; Refill: 0    2. Morbid obesity with body mass index of 50.0-59.9 in adult    Continue to rest, ice, and elevate left ankle.  Wrap with Ace wrap for comfort and support.  Ibuprofen for pain and swelling.            An After Visit Summary was printed and given to the patient at discharge.  No follow-ups on file.    I have discussed the patient results/orders and plan/recommendation with them at today's visit.      Karrie Samaniego, APRN   01/17/2025

## 2025-01-20 DIAGNOSIS — M25.572 PAIN AND SWELLING OF LEFT ANKLE: ICD-10-CM

## 2025-01-20 DIAGNOSIS — M25.472 PAIN AND SWELLING OF LEFT ANKLE: ICD-10-CM

## 2025-01-27 ENCOUNTER — OFFICE VISIT (OUTPATIENT)
Dept: FAMILY MEDICINE CLINIC | Facility: CLINIC | Age: 30
End: 2025-01-27

## 2025-01-27 VITALS
HEIGHT: 63 IN | HEART RATE: 78 BPM | SYSTOLIC BLOOD PRESSURE: 164 MMHG | DIASTOLIC BLOOD PRESSURE: 97 MMHG | WEIGHT: 306 LBS | BODY MASS INDEX: 54.22 KG/M2 | OXYGEN SATURATION: 98 % | RESPIRATION RATE: 16 BRPM | TEMPERATURE: 100.2 F

## 2025-01-27 DIAGNOSIS — J06.9 VIRAL UPPER RESPIRATORY TRACT INFECTION: ICD-10-CM

## 2025-01-27 DIAGNOSIS — G43.019 INTRACTABLE MIGRAINE WITHOUT AURA AND WITHOUT STATUS MIGRAINOSUS: Primary | ICD-10-CM

## 2025-01-27 DIAGNOSIS — R11.0 NAUSEA: ICD-10-CM

## 2025-01-27 DIAGNOSIS — R50.9 FEVER, UNSPECIFIED FEVER CAUSE: ICD-10-CM

## 2025-01-27 DIAGNOSIS — E66.01 MORBID OBESITY WITH BODY MASS INDEX OF 50.0-59.9 IN ADULT: Chronic | ICD-10-CM

## 2025-01-27 LAB
EXPIRATION DATE: NORMAL
FLUAV AG UPPER RESP QL IA.RAPID: NOT DETECTED
FLUBV AG UPPER RESP QL IA.RAPID: NOT DETECTED
INTERNAL CONTROL: NORMAL
Lab: NORMAL
SARS-COV-2 AG UPPER RESP QL IA.RAPID: NOT DETECTED

## 2025-01-27 PROCEDURE — 87428 SARSCOV & INF VIR A&B AG IA: CPT | Performed by: NURSE PRACTITIONER

## 2025-01-27 PROCEDURE — 99213 OFFICE O/P EST LOW 20 MIN: CPT | Performed by: NURSE PRACTITIONER

## 2025-01-27 PROCEDURE — 96372 THER/PROPH/DIAG INJ SC/IM: CPT | Performed by: NURSE PRACTITIONER

## 2025-01-27 RX ORDER — TRIAMCINOLONE ACETONIDE 40 MG/ML
40 INJECTION, SUSPENSION INTRA-ARTICULAR; INTRAMUSCULAR ONCE
Status: COMPLETED | OUTPATIENT
Start: 2025-01-27 | End: 2025-01-27

## 2025-01-27 RX ORDER — KETOROLAC TROMETHAMINE 30 MG/ML
30 INJECTION, SOLUTION INTRAMUSCULAR; INTRAVENOUS ONCE
Status: COMPLETED | OUTPATIENT
Start: 2025-01-27 | End: 2025-01-27

## 2025-01-27 RX ORDER — ONDANSETRON 4 MG/1
4 TABLET, ORALLY DISINTEGRATING ORAL EVERY 8 HOURS PRN
Qty: 30 TABLET | Refills: 0 | Status: SHIPPED | OUTPATIENT
Start: 2025-01-27

## 2025-01-27 RX ADMIN — KETOROLAC TROMETHAMINE 30 MG: 30 INJECTION, SOLUTION INTRAMUSCULAR; INTRAVENOUS at 09:16

## 2025-01-27 RX ADMIN — TRIAMCINOLONE ACETONIDE 40 MG: 40 INJECTION, SUSPENSION INTRA-ARTICULAR; INTRAMUSCULAR at 09:16

## 2025-01-27 NOTE — LETTER
January 27, 2025     Patient: Elias Schroeder   YOB: 1995   Date of Visit: 1/27/2025       To Whom It May Concern:    It is my medical opinion that Elias Schroeder may return to work on 01/28/2025 .  Please excuse Elias from work for the following dates: 01/26/2025 and 01/27/2025.           Sincerely,        Karrie Samaniego APRN

## 2025-01-27 NOTE — PROGRESS NOTES
Subjective     Chief Complaint   Patient presents with    Migraine     Symptoms started 2 days ago    Nausea    Fatigue       Migraine  Nausea  Symptoms include fatigue and nausea.    Fatigue  Symptoms include fatigue and nausea.        Patient presents today with migraine, nausea, and fatigue.  Symptoms started 4 days ago.  He missed work yesterday.  Cough has improved.  Has a low grade fever.      Patient's PMR from outside medical facility reviewed and noted.    Review of Systems   Constitutional:  Positive for fatigue.   Gastrointestinal:  Positive for nausea.        Otherwise complete ROS reviewed and negative except as mentioned in the HPI.    Past Medical History:   Past Medical History:   Diagnosis Date    ADHD     Anxiety     Depression     Diabetes mellitus     Insomnia     Obesity     Polycystic ovarian syndrome     Prediabetes      Past Surgical History:  Past Surgical History:   Procedure Laterality Date    TONSILLECTOMY       Social History:  reports that he quit smoking about 7 years ago. His smoking use included cigarettes, pipe, and cigars. He started smoking about 12 years ago. He has a 1.3 pack-year smoking history. He has been exposed to tobacco smoke. He has never used smokeless tobacco. He reports that he does not currently use alcohol. He reports current drug use. Drug: Marijuana.    Family History: family history includes Alcohol abuse in his father; Anxiety disorder in his father and mother; Arthritis in his maternal grandmother; Depression in his father and mother; Diabetes in his father and mother; Drug abuse in his father; Early death in his paternal uncle; Fibromyalgia in his maternal grandmother and mother; Hearing loss in his father; Heart attack in his mother; Heart disease in his mother; Kidney disease in his father; Kidney failure in his father; Lupus in his father; Mental illness in his father and mother; Other in his father; Vision loss in his mother.   "    Allergies:  Allergies   Allergen Reactions    Cinnamon Swelling    Latex Rash    Silicon Rash     Medications:  Prior to Admission medications    Medication Sig Start Date End Date Taking? Authorizing Provider   albuterol sulfate  (90 Base) MCG/ACT inhaler Inhale 2 puffs Every 4 (Four) to 6 (Six) Hours As Needed for Wheezing or Shortness of Air (or persistant cough.). 10/25/24  Yes Karrie Samaniego APRN   cyclobenzaprine (FLEXERIL) 5 MG tablet Take 1 tablet by mouth 3 (Three) Times a Day As Needed for Muscle Spasms. 8/14/24  Yes Karrie Samaniego APRN   DULoxetine (Cymbalta) 60 MG capsule Take 1 capsule by mouth Daily. 7/3/24  Yes Karrie Samaniego APRN   Elastic Bandages & Supports (ACE Ankle Brace) misc Use 1 Device As Needed (ankle pain and swelling). 1/17/25  Yes Karrie Samaniego APRN   famotidine (Pepcid) 40 MG tablet Take 1 tablet by mouth Daily. 5/28/24  Yes Karrie Samaniego APRN   ferrous sulfate 324 (65 Fe) MG tablet delayed-release EC tablet Take 1 tablet by mouth Daily With Breakfast. 11/29/23  Yes Karrie Samaniego APRN   ondansetron ODT (ZOFRAN-ODT) 4 MG disintegrating tablet DISSOLVE 1 TABLET IN MOUTH EVERY 8 HOURS AS NEEDED FOR NAUSEA AND VOMITING 12/9/24  Yes Karrie Samaniego APRN   fluticasone (FLONASE) 50 MCG/ACT nasal spray 2 sprays into the nostril(s) as directed by provider Daily.  Patient not taking: Reported on 1/27/2025 8/14/24   Karrie Samaniego APRN   vitamin D (ERGOCALCIFEROL) 1.25 MG (07680 UT) capsule capsule Take 1 capsule by mouth 1 (One) Time Per Week for 360 days.  Patient not taking: Reported on 1/27/2025 10/25/24 10/20/25  Karrie Samaniego APRN       Objective     Vital Signs: /97 (BP Location: Left arm, Patient Position: Sitting, Cuff Size: Adult)   Pulse 78   Temp 100.2 °F (37.9 °C) (Infrared)   Resp 16   Ht 160 cm (63\")   Wt (!) 139 kg (306 lb)   SpO2 98%   BMI 54.21 kg/m² "   Physical Exam  Vitals and nursing note reviewed.   Constitutional:       Appearance: He is morbidly obese.   HENT:      Head: Normocephalic.   Cardiovascular:      Rate and Rhythm: Normal rate and regular rhythm.      Pulses: Normal pulses.      Heart sounds: Normal heart sounds.   Pulmonary:      Effort: Pulmonary effort is normal.      Breath sounds: Normal breath sounds.   Musculoskeletal:         General: Normal range of motion.   Skin:     General: Skin is warm and dry.   Neurological:      General: No focal deficit present.      Mental Status: He is alert and oriented to person, place, and time.   Psychiatric:         Mood and Affect: Mood normal.         Behavior: Behavior normal.         Advance Care Planning            Results Reviewed:  Glucose   Date Value Ref Range Status   05/01/2024 274 (H) 70 - 99 mg/dL Final     BUN   Date Value Ref Range Status   05/01/2024 8 6 - 20 mg/dL Final     Creatinine   Date Value Ref Range Status   05/01/2024 0.64 (L) 0.76 - 1.27 mg/dL Final     Sodium   Date Value Ref Range Status   05/01/2024 136 134 - 144 mmol/L Final     Potassium   Date Value Ref Range Status   05/01/2024 4.1 3.5 - 5.2 mmol/L Final     Chloride   Date Value Ref Range Status   05/01/2024 100 96 - 106 mmol/L Final     Total CO2   Date Value Ref Range Status   05/01/2024 20 20 - 29 mmol/L Final     Calcium   Date Value Ref Range Status   05/01/2024 8.8 8.7 - 10.2 mg/dL Final     ALT (SGPT)   Date Value Ref Range Status   05/01/2024 28 0 - 44 IU/L Final     AST (SGOT)   Date Value Ref Range Status   05/01/2024 19 0 - 40 IU/L Final     WBC   Date Value Ref Range Status   05/01/2024 7.9 3.4 - 10.8 x10E3/uL Final     Hematocrit   Date Value Ref Range Status   05/01/2024 40.7 37.5 - 51.0 % Final     Platelets   Date Value Ref Range Status   05/01/2024 325 150 - 450 x10E3/uL Final     Triglycerides   Date Value Ref Range Status   11/27/2023 68 0 - 149 mg/dL Final     HDL Cholesterol   Date Value Ref Range  Status   11/27/2023 71 >39 mg/dL Final     LDL Chol Calc (NIH)   Date Value Ref Range Status   11/27/2023 82 0 - 99 mg/dL Final     Hemoglobin A1C   Date Value Ref Range Status   08/14/2024 8.2 (A) 4.5 - 5.7 % Final     Office Visit on 01/27/2025   Component Date Value Ref Range Status    SARS Antigen 01/27/2025 Not Detected  Not Detected, Presumptive Negative Final    Influenza A Antigen ART 01/27/2025 Not Detected  Not Detected Final    Influenza B Antigen ART 01/27/2025 Not Detected  Not Detected Final    Internal Control 01/27/2025 Passed  Passed Final    Lot Number 01/27/2025 [id number]   Final    Expiration Date 01/27/2025 10/23/2025   Final          Assessment / Plan     Assessment/Plan     Diagnoses and all orders for this visit:    1. Intractable migraine without aura and without status migrainosus (Primary)  -     triamcinolone acetonide (KENALOG-40) injection 40 mg  -     ketorolac (TORADOL) injection 30 mg    2. Viral upper respiratory tract infection    3. Fever, unspecified fever cause  -     POCT SARS-CoV-2 Antigen ART + Flu    4. Nausea  -     ondansetron ODT (ZOFRAN-ODT) 4 MG disintegrating tablet; Take 1 tablet by mouth Every 8 (Eight) Hours As Needed for Nausea or Vomiting.  Dispense: 30 tablet; Refill: 0    5. Morbid obesity with body mass index of 50.0-59.9 in adult    Negative for COVID-19 and flu.  Increase fluids.  Tylenol for pain and fever.             An After Visit Summary was printed and given to the patient at discharge.  Return if symptoms worsen or fail to improve.    I have discussed the patient results/orders and plan/recommendation with them at today's visit.      Karrie Samaniego, APRN   01/27/2025

## 2025-02-10 DIAGNOSIS — M25.50 MULTIPLE JOINT PAIN: ICD-10-CM

## 2025-02-10 DIAGNOSIS — F33.2 SEVERE EPISODE OF RECURRENT MAJOR DEPRESSIVE DISORDER, WITHOUT PSYCHOTIC FEATURES: ICD-10-CM

## 2025-02-10 DIAGNOSIS — R11.0 NAUSEA: ICD-10-CM

## 2025-02-11 NOTE — TELEPHONE ENCOUNTER
Rx Refill Note  Requested Prescriptions     Pending Prescriptions Disp Refills    DULoxetine (Cymbalta) 60 MG capsule 30 capsule 2     Sig: Take 1 capsule by mouth Daily.    ondansetron ODT (ZOFRAN-ODT) 4 MG disintegrating tablet 30 tablet 0     Sig: Take 1 tablet by mouth Every 8 (Eight) Hours As Needed for Nausea or Vomiting.      Last office visit with prescribing clinician: 1/27/2025   Last telemedicine visit with prescribing clinician: Visit date not found   Next office visit with prescribing clinician: 2/21/2025                         Would you like a call back once the refill request has been completed: [] Yes [] No    If the office needs to give you a call back, can they leave a voicemail: [] Yes [] No    Flory Amado MA  02/11/25, 11:38 CST

## 2025-02-12 ENCOUNTER — TELEMEDICINE (OUTPATIENT)
Dept: FAMILY MEDICINE CLINIC | Facility: CLINIC | Age: 30
End: 2025-02-12
Payer: COMMERCIAL

## 2025-02-12 DIAGNOSIS — R50.9 FEVER, UNSPECIFIED FEVER CAUSE: ICD-10-CM

## 2025-02-12 DIAGNOSIS — R11.0 NAUSEA: ICD-10-CM

## 2025-02-12 DIAGNOSIS — Z20.828 EXPOSURE TO THE FLU: Primary | ICD-10-CM

## 2025-02-12 PROCEDURE — 99213 OFFICE O/P EST LOW 20 MIN: CPT | Performed by: NURSE PRACTITIONER

## 2025-02-12 RX ORDER — DULOXETIN HYDROCHLORIDE 60 MG/1
60 CAPSULE, DELAYED RELEASE ORAL DAILY
Qty: 30 CAPSULE | Refills: 2 | Status: SHIPPED | OUTPATIENT
Start: 2025-02-12

## 2025-02-12 RX ORDER — OSELTAMIVIR PHOSPHATE 75 MG/1
75 CAPSULE ORAL 2 TIMES DAILY
Qty: 10 CAPSULE | Refills: 0 | Status: SHIPPED | OUTPATIENT
Start: 2025-02-12 | End: 2025-02-17

## 2025-02-12 RX ORDER — PROMETHAZINE HYDROCHLORIDE 25 MG/1
25 TABLET ORAL EVERY 6 HOURS PRN
Qty: 10 TABLET | Refills: 0 | Status: SHIPPED | OUTPATIENT
Start: 2025-02-12

## 2025-02-12 RX ORDER — ONDANSETRON 4 MG/1
4 TABLET, ORALLY DISINTEGRATING ORAL EVERY 8 HOURS PRN
Qty: 30 TABLET | Refills: 0 | Status: SHIPPED | OUTPATIENT
Start: 2025-02-12

## 2025-02-12 NOTE — PROGRESS NOTES
"      Chief Complaint  No chief complaint on file.    Subjective           Elias Schroeder presents to [facility]  History of Present Illness  Patient presents today with nausea and vomiting.  Partner was recently had gastroenteritis.  Burning sensation, sore in joints, feels hot, post nasal drainage.  Taking zofran and liquid IV and naproxen.       Objective   Vital Signs:   There were no vitals taken for this visit.    Estimated body mass index is 53.3 kg/m² as calculated from the following:    Height as of 2/21/25: 160 cm (62.99\").    Weight as of 2/21/25: 136 kg (300 lb 12.8 oz).     Physical Exam   Constitutional: He appears well-developed.   Eyes: Conjunctivae are normal.   Pulmonary/Chest: Effort normal.   Musculoskeletal: Normal range of motion.   Neurological: He is alert.   Skin: Skin is warm and dry.   Psychiatric: He has a normal mood and affect.     Result Review :                   Assessment and Plan      Diagnoses and all orders for this visit:    1. Exposure to the flu (Primary)  -     oseltamivir (Tamiflu) 75 MG capsule; Take 1 capsule by mouth 2 (Two) Times a Day for 5 days.  Dispense: 10 capsule; Refill: 0    2. Nausea  -     promethazine (PHENERGAN) 25 MG tablet; Take 1 tablet by mouth Every 6 (Six) Hours As Needed for Nausea or Vomiting.  Dispense: 10 tablet; Refill: 0    3. Fever, unspecified fever cause  -     oseltamivir (Tamiflu) 75 MG capsule; Take 1 capsule by mouth 2 (Two) Times a Day for 5 days.  Dispense: 10 capsule; Refill: 0        Follow Up     Return if symptoms worsen or fail to improve.  Patient was given instructions and counseling regarding his condition or for health maintenance advice. Please see specific information pulled into the AVS if appropriate.     Mode of Visit: Video  Location of patient: home  Location of provider: [facility] [place]  You have chosen to receive care through a telehealth visit.  The patient has signed the video visit consent " form.  The visit included audio and video interaction. No technical issues occurred during this visit.

## 2025-02-21 ENCOUNTER — OFFICE VISIT (OUTPATIENT)
Dept: FAMILY MEDICINE CLINIC | Facility: CLINIC | Age: 30
End: 2025-02-21
Payer: COMMERCIAL

## 2025-02-21 VITALS
DIASTOLIC BLOOD PRESSURE: 84 MMHG | SYSTOLIC BLOOD PRESSURE: 132 MMHG | HEART RATE: 79 BPM | OXYGEN SATURATION: 97 % | HEIGHT: 63 IN | TEMPERATURE: 97.4 F | BODY MASS INDEX: 53.3 KG/M2 | WEIGHT: 300.8 LBS

## 2025-02-21 DIAGNOSIS — J02.9 PHARYNGITIS, UNSPECIFIED ETIOLOGY: ICD-10-CM

## 2025-02-21 DIAGNOSIS — H65.91 RIGHT OTITIS MEDIA WITH EFFUSION: Primary | ICD-10-CM

## 2025-02-21 DIAGNOSIS — E11.65 TYPE 2 DIABETES MELLITUS WITH HYPERGLYCEMIA, WITHOUT LONG-TERM CURRENT USE OF INSULIN: Chronic | ICD-10-CM

## 2025-02-21 DIAGNOSIS — E66.01 MORBID OBESITY WITH BODY MASS INDEX OF 50.0-59.9 IN ADULT: Chronic | ICD-10-CM

## 2025-02-21 DIAGNOSIS — J01.40 ACUTE NON-RECURRENT PANSINUSITIS: ICD-10-CM

## 2025-02-21 RX ORDER — METHYLPREDNISOLONE 4 MG/1
TABLET ORAL
Qty: 21 TABLET | Refills: 0 | Status: SHIPPED | OUTPATIENT
Start: 2025-02-21

## 2025-02-21 RX ORDER — FLUTICASONE PROPIONATE 50 MCG
2 SPRAY, SUSPENSION (ML) NASAL DAILY
Qty: 16 G | Refills: 5 | Status: SHIPPED | OUTPATIENT
Start: 2025-02-21

## 2025-02-21 NOTE — PROGRESS NOTES
"        Subjective     Chief Complaint   Patient presents with    Shortness of Breath     \"Lungs feel exhausted\"     Cough    Sore Throat    Fatigue    Ear Fullness     Can't hear out of right ear     Headache    Fever       Shortness of Breath  Associated symptoms include a fever, headaches and a sore throat.   Cough  Associated symptoms include a fever, headaches, a sore throat and shortness of breath.   Sore Throat   Associated symptoms include coughing, headaches, a plugged ear sensation and shortness of breath.   Fatigue  Symptoms include cough, fatigue, a fever, headaches and sore throat.    Ear Fullness   Associated symptoms include coughing, headaches and a sore throat.   Headache  Fever   Associated symptoms include coughing, headaches and a sore throat.     History of Present Illness  The patient presents for evaluation of sinusitis, otitis, and vitamin D deficiency.    He was previously prescribed Tamiflu and Phenergan during a virtual consultation on the 12th. Despite completing the Tamiflu course, he reports no improvement in his symptoms. He experiences a sore throat, significant fatigue, generalized body aches, headaches, and labored breathing. He has been producing excessive mucus, which he occasionally coughs up and chokes on. He also reports episodes of vomiting mucus and stomach acid into his mouth, which have prevented him from eating or drinking anything. He has attempted to manage these symptoms with Zofran, but it has not provided relief. He has not been swabbed today. He has not been around anybody who has been sick. He has not experienced any diarrhea. He has been using an albuterol inhaler intermittently but does not have Flonase.    He also reports a loss of hearing in his right ear, which he attributes to the Tamiflu.    His vitamin D levels were low a while back, and he was prescribed a weekly vitamin D pill. However, he is no longer taking it as [facility] can not fill the prescription " anymore. He had to cancel an appointment for fasting labs today but plans to reschedule it.    SOCIAL HISTORY  He works at [employer].    MEDICATIONS  Current: Tamiflu, Phenergan, albuterol inhaler, Zofran  Discontinued: Flonase, vitamin D      Past Medical History:   Past Medical History:   Diagnosis Date    ADHD     Anxiety     Depression     Diabetes mellitus     Insomnia     Obesity     Polycystic ovarian syndrome     Prediabetes      Past Surgical History:  Past Surgical History:   Procedure Laterality Date    TONSILLECTOMY       Social History:  reports that he quit smoking about 7 years ago. His smoking use included cigarettes, pipe, and cigars. He started smoking about 12 years ago. He has a 1.3 pack-year smoking history. He has been exposed to tobacco smoke. He has never used smokeless tobacco. He reports that he does not currently use alcohol. He reports current drug use. Drug: Marijuana.    Family History: family history includes Alcohol abuse in his father; Anxiety disorder in his father and mother; Arthritis in his maternal grandmother; Depression in his father and mother; Diabetes in his father and mother; Drug abuse in his father; Early death in his paternal uncle; Fibromyalgia in his maternal grandmother and mother; Hearing loss in his father; Heart attack in his mother; Heart disease in his mother; Kidney disease in his father; Kidney failure in his father; Lupus in his father; Mental illness in his father and mother; Other in his father; Vision loss in his mother.      Allergies:  Allergies   Allergen Reactions    Cinnamon Swelling    Latex Rash    Silicon Rash     Medications:  Prior to Admission medications    Medication Sig Start Date End Date Taking? Authorizing Provider   albuterol sulfate  (90 Base) MCG/ACT inhaler Inhale 2 puffs Every 4 (Four) to 6 (Six) Hours As Needed for Wheezing or Shortness of Air (or persistant cough.). 10/25/24  Yes Karrie Samaniego  EMANUEL Contreras   DULoxetine (Cymbalta) 60 MG capsule Take 1 capsule by mouth Daily. 2/12/25  Yes Karrei Samaniego APRN   Elastic Bandages & Supports (ACE Ankle Brace) [facility] Use 1 Device As Needed (ankle pain and swelling). 1/17/25  Yes Karrie Samaniego APRN   famotidine (Pepcid) 40 MG tablet Take 1 tablet by mouth Daily. 5/28/24  Yes Karrie Samaniego APRN   ferrous sulfate 324 (65 Fe) MG tablet delayed-release EC tablet Take 1 tablet by mouth Daily With Breakfast. 11/29/23  Yes Karrie Samaniego APRN   fluticasone (FLONASE) 50 MCG/ACT nasal spray Administer 2 sprays into the nostril(s) as directed by provider Daily. 2/21/25  Yes Jonas Moreno MD   ondansetron ODT (ZOFRAN-ODT) 4 MG disintegrating tablet Take 1 tablet by mouth Every 8 (Eight) Hours As Needed for Nausea or Vomiting. 2/12/25  Yes Karrie Samaniego APRN   promethazine (PHENERGAN) 25 MG tablet Take 1 tablet by mouth Every 6 (Six) Hours As Needed for Nausea or Vomiting. 2/12/25  Yes Karrie Samaniego APRN   amoxicillin-clavulanate (AUGMENTIN) 875-125 MG per tablet Take 1 tablet by mouth 2 (Two) Times a Day. 2/21/25   Jonas Moreno MD   methylPREDNISolone (MEDROL) 4 MG dose pack Take as directed on package instructions. 2/21/25   Jonas Moreno MD   vitamin D (ERGOCALCIFEROL) 1.25 MG (32808 UT) capsule capsule Take 1 capsule by mouth 1 (One) Time Per Week for 360 days.  Patient not taking: Reported on 1/17/2025 10/25/24 10/20/25  Karrie Samaniego APRN   cyclobenzaprine (FLEXERIL) 5 MG tablet Take 1 tablet by mouth 3 (Three) Times a Day As Needed for Muscle Spasms.  Patient not taking: Reported on 2/21/202521/2025 8/14/24 2/21/25  Karrie Samaniego APRN   fluticasone (FLONASE) 50 MCG/ACT nasal spray 2 sprays into the nostril(s) as directed by provider Daily.  Patient not taking: Reported on 1/17/2025 8/14/24 2/21/25  Karrie Samaniego APRN           Review of systems  "  negative unless otherwise specified above in HPI    Objective     Vital Signs: /84 (BP Location: Right arm, Patient Position: Sitting, Cuff Size: Large Adult)   Pulse 79   Temp 97.4 °F (36.3 °C) (Infrared)   Ht 160 cm (62.99\")   Wt (!) 136 kg (300 lb 12.8 oz)   SpO2 97%   BMI 53.30 kg/m²     Physical Exam  Vitals and nursing note reviewed.   Constitutional:       Appearance: Normal appearance. He is obese.   HENT:      Head: Normocephalic and atraumatic.      Right Ear: External ear normal. Decreased hearing noted. A middle ear effusion is present. Tympanic membrane is erythematous and bulging.      Left Ear: Hearing, tympanic membrane, ear canal and external ear normal.      Nose: Congestion and rhinorrhea present.      Mouth/Throat:      Mouth: Mucous membranes are moist.      Pharynx: Oropharynx is clear. Posterior oropharyngeal erythema present. No oropharyngeal exudate.   Eyes:      Extraocular Movements: Extraocular movements intact.      Pupils: Pupils are equal, round, and reactive to light.   Cardiovascular:      Rate and Rhythm: Normal rate and regular rhythm.      Pulses: Normal pulses.      Heart sounds: Normal heart sounds.   Pulmonary:      Effort: Pulmonary effort is normal.      Breath sounds: Normal breath sounds.   Abdominal:      General: Abdomen is flat. Bowel sounds are normal.      Palpations: Abdomen is soft.   Musculoskeletal:         General: Normal range of motion.      Cervical back: Normal range of motion and neck supple.   Skin:     General: Skin is warm and dry.      Capillary Refill: Capillary refill takes less than 2 seconds.   Neurological:      General: No focal deficit present.      Mental Status: He is alert.       Physical Exam  The right ear is significantly inflamed. The throat appears streaky red.  The lungs sound clear. No wheezing detected.             Results Reviewed:  Glucose   Date Value Ref Range Status   05/01/2024 274 (H) 70 - 99 mg/dL Final     BUN "   Date Value Ref Range Status   05/01/2024 8 6 - 20 mg/dL Final     Creatinine   Date Value Ref Range Status   05/01/2024 0.64 (L) 0.76 - 1.27 mg/dL Final     Sodium   Date Value Ref Range Status   05/01/2024 136 134 - 144 mmol/L Final     Potassium   Date Value Ref Range Status   05/01/2024 4.1 3.5 - 5.2 mmol/L Final     Chloride   Date Value Ref Range Status   05/01/2024 100 96 - 106 mmol/L Final     Total CO2   Date Value Ref Range Status   05/01/2024 20 20 - 29 mmol/L Final     Calcium   Date Value Ref Range Status   05/01/2024 8.8 8.7 - 10.2 mg/dL Final     ALT (SGPT)   Date Value Ref Range Status   05/01/2024 28 0 - 44 IU/L Final     AST (SGOT)   Date Value Ref Range Status   05/01/2024 19 0 - 40 IU/L Final     WBC   Date Value Ref Range Status   05/01/2024 7.9 3.4 - 10.8 x10E3/uL Final     Hematocrit   Date Value Ref Range Status   05/01/2024 40.7 37.5 - 51.0 % Final     Platelets   Date Value Ref Range Status   05/01/2024 325 150 - 450 x10E3/uL Final     Triglycerides   Date Value Ref Range Status   11/27/2023 68 0 - 149 mg/dL Final     HDL Cholesterol   Date Value Ref Range Status   11/27/2023 71 >39 mg/dL Final     LDL Chol Calc (NIH)   Date Value Ref Range Status   11/27/2023 82 0 - 99 mg/dL Final     Hemoglobin A1C   Date Value Ref Range Status   08/14/2024 8.2 (A) 4.5 - 5.7 % Final     The following data was reviewed by: Jonas Moreno MD on 02/21/2025:  CMP          5/1/2024    10:14   CMP   Glucose 274    BUN 8    Creatinine 0.64    EGFR 134    Sodium 136    Potassium 4.1    Chloride 100    Calcium 8.8    Total Protein 7.1    Albumin 4.1    Globulin 3.0    Total Bilirubin 0.2    Alkaline Phosphatase 87    AST (SGOT) 19    ALT (SGPT) 28    Albumin/Globulin Ratio 1.4    BUN/Creatinine Ratio 13      CBC          5/1/2024    10:14   CBC   WBC 7.9    RBC 5.36    Hemoglobin 12.7    Hematocrit 40.7    MCV 76    MCH 23.7    MCHC 31.2    RDW 14.4    Platelets 325        Most Recent A1C          8/14/2024     08:39   HGBA1C Most Recent   Hemoglobin A1C 8.2          Results  Laboratory Studies  Vitamin D levels were low.        Assessment / Plan     Assessment/Plan:   Diagnosis Plan   1. Right otitis media with effusion  fluticasone (FLONASE) 50 MCG/ACT nasal spray    methylPREDNISolone (MEDROL) 4 MG dose pack      2. Acute non-recurrent pansinusitis  fluticasone (FLONASE) 50 MCG/ACT nasal spray    amoxicillin-clavulanate (AUGMENTIN) 875-125 MG per tablet    methylPREDNISolone (MEDROL) 4 MG dose pack      3. Pharyngitis, unspecified etiology        4. Morbid obesity with body mass index of 50.0-59.9 in adult        5. Type 2 diabetes mellitus with hyperglycemia, without long-term current use of insulin            Assessment & Plan  1. Sinusitis.  He reports symptoms of sore throat, fatigue, body aches, headaches, and difficulty breathing. Examination reveals a red, streaky throat and significant inflammation in the right ear, suggesting sinusitis. A prescription for Augmentin, to be taken twice daily for 10 days, has been issued. Additionally, Flonase has been prescribed, with instructions to administer 2 sprays in each nostril daily for a duration of one month. A steroid Dosepak has also been prescribed, with a tapering schedule of 6 pills on the first day, decreasing by one pill each day until completion.    2. Otitis.  He reports loss of hearing in the right ear and significant ear pain. Examination confirms a raging ear infection. The prescribed Augmentin and Flonase will address this condition as well.    3. Vitamin D deficiency.  He reports that his vitamin D levels were low in the past and he was previously on a weekly vitamin D pill. He mentioned that [facility] can no longer fill his vitamin D prescription. He is advised to discuss this with his primary care provider during his upcoming appointment for fasting labs.      Return if symptoms worsen or fail to improve, for Annual physical. unless patient needs to be  seen sooner or acute issues arise.      I have discussed the patient results/orders and and plan/recommendation with them at today's visit.      Signed by:    Dr. Jonas Moreno Date: 02/21/25    EMR Dictation/Transcription disclaimer:   Some of this note may be an electronic transcription/translation of spoken language to printed text. The electronic translation of spoken language may permit erroneous, or at times, nonsensical words or phrases to be inadvertently transcribed; Although I have reviewed the note for such errors, some may still exist.      Patient or patient representative verbalized consent for the use of Ambient Listening during the visit with  Jonas Moreno MD for chart documentation. 2/21/2025  09:25 CST

## 2025-02-28 ENCOUNTER — TELEMEDICINE (OUTPATIENT)
Dept: FAMILY MEDICINE CLINIC | Facility: CLINIC | Age: 30
End: 2025-02-28
Payer: COMMERCIAL

## 2025-02-28 DIAGNOSIS — H65.91 RIGHT OTITIS MEDIA WITH EFFUSION: ICD-10-CM

## 2025-02-28 DIAGNOSIS — R06.02 SHORTNESS OF BREATH: ICD-10-CM

## 2025-02-28 DIAGNOSIS — J01.40 ACUTE NON-RECURRENT PANSINUSITIS: Primary | ICD-10-CM

## 2025-02-28 DIAGNOSIS — J40 BRONCHITIS: ICD-10-CM

## 2025-02-28 DIAGNOSIS — E66.01 MORBID OBESITY WITH BODY MASS INDEX OF 50.0-59.9 IN ADULT: Chronic | ICD-10-CM

## 2025-02-28 DIAGNOSIS — E11.65 TYPE 2 DIABETES MELLITUS WITH HYPERGLYCEMIA, WITHOUT LONG-TERM CURRENT USE OF INSULIN: Chronic | ICD-10-CM

## 2025-02-28 RX ORDER — SULFAMETHOXAZOLE AND TRIMETHOPRIM 800; 160 MG/1; MG/1
1 TABLET ORAL 2 TIMES DAILY
Qty: 20 TABLET | Refills: 0 | Status: SHIPPED | OUTPATIENT
Start: 2025-02-28

## 2025-02-28 RX ORDER — DEXTROMETHORPHAN HYDROBROMIDE AND PROMETHAZINE HYDROCHLORIDE 15; 6.25 MG/5ML; MG/5ML
5 SYRUP ORAL 4 TIMES DAILY PRN
Qty: 240 ML | Refills: 0 | Status: SHIPPED | OUTPATIENT
Start: 2025-02-28

## 2025-02-28 RX ORDER — ALBUTEROL SULFATE 90 UG/1
2 INHALANT RESPIRATORY (INHALATION)
Qty: 18 G | Refills: 0 | Status: SHIPPED | OUTPATIENT
Start: 2025-02-28

## 2025-02-28 RX ORDER — BUDESONIDE AND FORMOTEROL FUMARATE DIHYDRATE 160; 4.5 UG/1; UG/1
2 AEROSOL RESPIRATORY (INHALATION)
Qty: 10.2 G | Refills: 1 | Status: SHIPPED | OUTPATIENT
Start: 2025-02-28

## 2025-02-28 NOTE — PROGRESS NOTES
Telehealth visit, he is at home, I'm in the [place] office  Subjective     No chief complaint on file.      History of Present Illness  Elias was seen on telehealth the 12th by Karrie and treated for flu A.  I saw him in the office last Friday I think to put him on Augmentin and Flonase for sinusitis and bilateral otitis media.  As he when he finished up his Dosepak this this week he began feeling very poorly again with fever severe body aches he is got a deep productive cough and he continues to blow a lot of purulent debris out of his sinuses he also says his ears still feel stopped up.    Past Medical History:   Past Medical History:   Diagnosis Date    ADHD     Anxiety     Depression     Diabetes mellitus     Insomnia     Obesity     Polycystic ovarian syndrome     Prediabetes      Past Surgical History:  Past Surgical History:   Procedure Laterality Date    TONSILLECTOMY       Social History:  reports that he quit smoking about 7 years ago. His smoking use included cigarettes, pipe, and cigars. He started smoking about 12 years ago. He has a 1.3 pack-year smoking history. He has been exposed to tobacco smoke. He has never used smokeless tobacco. He reports that he does not currently use alcohol. He reports current drug use. Drug: Marijuana.    Family History: family history includes Alcohol abuse in his father; Anxiety disorder in his father and mother; Arthritis in his maternal grandmother; Depression in his father and mother; Diabetes in his father and mother; Drug abuse in his father; Early death in his paternal uncle; Fibromyalgia in his maternal grandmother and mother; Hearing loss in his father; Heart attack in his mother; Heart disease in his mother; Kidney disease in his father; Kidney failure in his father; Lupus in his father; Mental illness in his father and mother; Other in his father; Vision loss in his mother.      Allergies:  Allergies   Allergen Reactions    Cinnamon Swelling    Latex Rash     Silicon Rash     Medications:  Prior to Admission medications    Medication Sig Start Date End Date Taking? Authorizing Provider   albuterol sulfate  (90 Base) MCG/ACT inhaler Inhale 2 puffs Every 4 (Four) to 6 (Six) Hours As Needed for Wheezing or Shortness of Air (or persistant cough.). 2/28/25  Yes Jonas Moreno MD   amoxicillin-clavulanate (AUGMENTIN) 875-125 MG per tablet Take 1 tablet by mouth 2 (Two) Times a Day. 2/21/25   Jonas Moreno MD   budesonide-formoterol (Symbicort) 160-4.5 MCG/ACT inhaler Inhale 2 puffs 2 (Two) Times a Day. 2/28/25   Jonas Moreno MD   DULoxetine (Cymbalta) 60 MG capsule Take 1 capsule by mouth Daily. 2/12/25   Karrie Samaniego APRN   Elastic Bandages & Supports (ACE Ankle Brace) misc Use 1 Device As Needed (ankle pain and swelling). 1/17/25   Karrie Samaniego APRN   famotidine (Pepcid) 40 MG tablet Take 1 tablet by mouth Daily. 5/28/24   Karrie Samaniego APRN   ferrous sulfate 324 (65 Fe) MG tablet delayed-release EC tablet Take 1 tablet by mouth Daily With Breakfast. 11/29/23   Karrie Samaniego APRN   fluticasone (FLONASE) 50 MCG/ACT nasal spray Administer 2 sprays into the nostril(s) as directed by provider Daily. 2/21/25   Jonas Moreno MD   methylPREDNISolone (MEDROL) 4 MG dose pack Take as directed on package instructions. 2/21/25   Jonas Moreno MD   ondansetron ODT (ZOFRAN-ODT) 4 MG disintegrating tablet Take 1 tablet by mouth Every 8 (Eight) Hours As Needed for Nausea or Vomiting. 2/12/25   Karrie Samaniego APRN   promethazine (PHENERGAN) 25 MG tablet Take 1 tablet by mouth Every 6 (Six) Hours As Needed for Nausea or Vomiting. 2/12/25   Karrie Samaniego APRN   promethazine-dextromethorphan (PROMETHAZINE-DM) 6.25-15 MG/5ML syrup Take 5 mL by mouth 4 (Four) Times a Day As Needed for Cough. 2/28/25   Jonas Moreno MD   sulfamethoxazole-trimethoprim (Bactrim DS) 800-160 MG per  tablet Take 1 tablet by mouth 2 (Two) Times a Day. 2/28/25   Jonas Moreno MD   vitamin D (ERGOCALCIFEROL) 1.25 MG (63367 UT) capsule capsule Take 1 capsule by mouth 1 (One) Time Per Week for 360 days.  Patient not taking: Reported on 1/17/2025 10/25/24 10/20/25  Karrie Samaniego APRN   albuterol sulfate  (90 Base) MCG/ACT inhaler Inhale 2 puffs Every 4 (Four) to 6 (Six) Hours As Needed for Wheezing or Shortness of Air (or persistant cough.). 10/25/24 2/28/25  Karrie Samaniego APRN           Review of systems   negative unless otherwise specified above in HPI    Objective     Vital Signs: There were no vitals taken for this visit.    Physical Exam  Constitutional:       Appearance: He is obese. He is ill-appearing.   Neurological:      General: No focal deficit present.      Mental Status: He is alert.   Psychiatric:         Mood and Affect: Mood normal.         Behavior: Behavior normal.         Thought Content: Thought content normal.                  Results Re    Assessment / Plan     Assessment/Plan:   Diagnosis Plan   1. Acute non-recurrent pansinusitis  sulfamethoxazole-trimethoprim (Bactrim DS) 800-160 MG per tablet      2. Shortness of breath  albuterol sulfate  (90 Base) MCG/ACT inhaler    budesonide-formoterol (Symbicort) 160-4.5 MCG/ACT inhaler      3. Bronchitis  sulfamethoxazole-trimethoprim (Bactrim DS) 800-160 MG per tablet    promethazine-dextromethorphan (PROMETHAZINE-DM) 6.25-15 MG/5ML syrup        Switch him over to Bactrim DS 1 p.o. twice daily I told him to use his albuterol inhaler every 4-6 hours and have added Symbicort to try to keep his lungs medicated giving him systemic steroids to raise his blood sugars.  Called in for Promethazine DM.  I also wonder if he has not picked up a COVID  infection.  I suggested he  a home test kit and let us know what he finds out.  Seems unlikely he would have flu A again do think he has a pretty severe  pansinusitis he had bilateral otitis and now has got a bronchitis.  We discussed fever control with 1 g of Tylenol every 8 hours to be followed an hour later by 800 mg of ibuprofen.  Finally he was instructed to go to the emergency room this weekend if he gets short of breath.  He needs to be seen in the office and have his diabetes addressed   presently on no treatment.  He told me he had gastroparesis from oral semaglutide and has been undergoing evaluation for that he may need to be started on insulin              No follow-ups on file. unless patient needs to be seen sooner or acute issues arise.      I have discussed the patient results/orders and and plan/recommendation with them at today's visit.      Signed by:    Dr. Jonas Moreno Date: 02/28/25    EMR Dictation/Transcription disclaimer:   Some of this note may be an electronic transcription/translation of spoken language to printed text. The electronic translation of spoken language may permit erroneous, or at times, nonsensical words or phrases to be inadvertently transcribed; Although I have reviewed the note for such errors, some may still exist.

## 2025-03-03 ENCOUNTER — OFFICE VISIT (OUTPATIENT)
Dept: FAMILY MEDICINE CLINIC | Facility: CLINIC | Age: 30
End: 2025-03-03
Payer: COMMERCIAL

## 2025-03-03 VITALS
HEART RATE: 93 BPM | SYSTOLIC BLOOD PRESSURE: 115 MMHG | RESPIRATION RATE: 18 BRPM | DIASTOLIC BLOOD PRESSURE: 84 MMHG | BODY MASS INDEX: 50.71 KG/M2 | TEMPERATURE: 99.4 F | OXYGEN SATURATION: 99 % | WEIGHT: 297 LBS | HEIGHT: 64 IN

## 2025-03-03 DIAGNOSIS — B37.9 ANTIBIOTIC-INDUCED YEAST INFECTION: ICD-10-CM

## 2025-03-03 DIAGNOSIS — J10.1 INFLUENZA A: Primary | ICD-10-CM

## 2025-03-03 DIAGNOSIS — T36.95XA ANTIBIOTIC-INDUCED YEAST INFECTION: ICD-10-CM

## 2025-03-03 DIAGNOSIS — R68.89 FLU-LIKE SYMPTOMS: ICD-10-CM

## 2025-03-03 LAB
EXPIRATION DATE: ABNORMAL
FLUAV AG UPPER RESP QL IA.RAPID: DETECTED
FLUBV AG UPPER RESP QL IA.RAPID: NOT DETECTED
INTERNAL CONTROL: ABNORMAL
Lab: ABNORMAL
SARS-COV-2 AG UPPER RESP QL IA.RAPID: NOT DETECTED

## 2025-03-03 RX ORDER — FLUCONAZOLE 150 MG/1
150 TABLET ORAL
Qty: 3 TABLET | Refills: 0 | Status: SHIPPED | OUTPATIENT
Start: 2025-03-03

## 2025-03-03 NOTE — LETTER
March 3, 2025     Patient: Elias Schroeder   YOB: 1995   Date of Visit: 3/3/2025       To Whom It May Concern:    It is my medical opinion that Elias Schroeder may return to work on 3/6/2025 if symptoms have improved and has remained fever free for 24 hours .           Sincerely,        EMANUEL Zuniga

## 2025-03-03 NOTE — PROGRESS NOTES
"Chief Complaint  Follow-up (Not improved from last visit/), Chills, Fever, Generalized Body Aches, Cough (Productive/), Earache, Headache, and Nausea    Subjective        Elias Schroeder presents to [facility]  History of Present Illness  Elias presents today with ongoing complaints of illness. Started feeling bad about 4 days ago. Had completed a telehealth visit on 25 with antibiotic, inhaler, and cough syrup being prescribed. Has been experiencing body aches, fever/chills, migraine, congestion, cough, and nausea/vomiting. Cough is described as being \"rattling\" and is \"coming in waves\". Describes feeling like not getting a good breath with coughing. Cough has been intermittently productive of mucus.     Past Medical History:   Diagnosis Date    ADHD     Anxiety     Depression     Diabetes mellitus     Insomnia     Obesity     Polycystic ovarian syndrome     Prediabetes      Past Surgical History:   Procedure Laterality Date    TONSILLECTOMY       Social History     Socioeconomic History    Marital status: Significant Other   Tobacco Use    Smoking status: Former     Current packs/day: 0.00     Average packs/day: 0.3 packs/day for 5.0 years (1.3 ttl pk-yrs)     Types: Cigarettes, Pipe, Cigars     Start date: 2013     Quit date: 2018     Years since quittin.1     Passive exposure: Past    Smokeless tobacco: Never   Vaping Use    Vaping status: Every Day    Substances: Nicotine, Flavoring    Devices: Disposable    Passive vaping exposure: Yes   Substance and Sexual Activity    Alcohol use: Not Currently    Drug use: Yes     Types: Marijuana    Sexual activity: Yes     Partners: Female     Birth control/protection: None, Partner of same sex     Comment: I’m a Trans Man so biological speaking we are same sex     Family History   Problem Relation Age of Onset    Depression Mother     Diabetes Mother     Fibromyalgia Mother     Heart attack Mother     Anxiety disorder " Mother     Heart disease Mother     Mental illness Mother     Vision loss Mother     Diabetes Father     Depression Father     Kidney failure Father     Lupus Father     Alcohol abuse Father     Anxiety disorder Father     Drug abuse Father     Hearing loss Father     Kidney disease Father     Mental illness Father     Other Father         lupus    Fibromyalgia Maternal Grandmother     Arthritis Maternal Grandmother     Early death Paternal Uncle        Medications:  Current Outpatient Medications   Medication Sig Dispense Refill    albuterol sulfate  (90 Base) MCG/ACT inhaler Inhale 2 puffs Every 4 (Four) to 6 (Six) Hours As Needed for Wheezing or Shortness of Air (or persistant cough.). 18 g 0    amoxicillin-clavulanate (AUGMENTIN) 875-125 MG per tablet Take 1 tablet by mouth 2 (Two) Times a Day. 20 tablet 0    budesonide-formoterol (Symbicort) 160-4.5 MCG/ACT inhaler Inhale 2 puffs 2 (Two) Times a Day. 10.2 g 1    DULoxetine (Cymbalta) 60 MG capsule Take 1 capsule by mouth Daily. 30 capsule 2    Elastic Bandages & Supports (ACE Ankle Brace) misc Use 1 Device As Needed (ankle pain and swelling). 1 each 0    famotidine (Pepcid) 40 MG tablet Take 1 tablet by mouth Daily. 30 tablet 5    ferrous sulfate 324 (65 Fe) MG tablet delayed-release EC tablet Take 1 tablet by mouth Daily With Breakfast. 30 tablet 11    fluticasone (FLONASE) 50 MCG/ACT nasal spray Administer 2 sprays into the nostril(s) as directed by provider Daily. 16 g 5    ondansetron ODT (ZOFRAN-ODT) 4 MG disintegrating tablet Take 1 tablet by mouth Every 8 (Eight) Hours As Needed for Nausea or Vomiting. 30 tablet 0    promethazine (PHENERGAN) 25 MG tablet Take 1 tablet by mouth Every 6 (Six) Hours As Needed for Nausea or Vomiting. 10 tablet 0    promethazine-dextromethorphan (PROMETHAZINE-DM) 6.25-15 MG/5ML syrup Take 5 mL by mouth 4 (Four) Times a Day As Needed for Cough. 240 mL 0    sulfamethoxazole-trimethoprim (Bactrim DS) 800-160 MG per tablet  "Take 1 tablet by mouth 2 (Two) Times a Day. 20 tablet 0    fluconazole (Diflucan) 150 MG tablet Take 1 tablet by mouth Every 3 (Three) Days. 3 tablet 0    methylPREDNISolone (MEDROL) 4 MG dose pack Take as directed on package instructions. 21 tablet 0    vitamin D (ERGOCALCIFEROL) 1.25 MG (86613 UT) capsule capsule Take 1 capsule by mouth 1 (One) Time Per Week for 360 days. (Patient not taking: Reported on 3/3/2025) 12 capsule 0     No current facility-administered medications for this visit.       Review of Systems  Review of systems is negative unless otherwise specified in HPI.    Objective   Vital Signs:  /84 (BP Location: Right arm, Patient Position: Sitting, Cuff Size: Adult)   Pulse 93   Temp 99.4 °F (37.4 °C) (Infrared)   Resp 18   Ht 162.6 cm (64\")   Wt 135 kg (297 lb)   SpO2 99%   BMI 50.98 kg/m²   Estimated body mass index is 50.98 kg/m² as calculated from the following:    Height as of this encounter: 162.6 cm (64\").    Weight as of this encounter: 135 kg (297 lb).          Physical Exam  Vitals and nursing note reviewed.   Constitutional:       Appearance: He is obese. He is ill-appearing.   HENT:      Head: Normocephalic.      Right Ear: Tympanic membrane normal.      Left Ear: Tympanic membrane normal.      Nose: Congestion and rhinorrhea present.      Mouth/Throat:      Mouth: Mucous membranes are moist.      Pharynx: Postnasal drip present.   Eyes:      Pupils: Pupils are equal, round, and reactive to light.   Cardiovascular:      Rate and Rhythm: Normal rate and regular rhythm.      Pulses: Normal pulses.      Heart sounds: Normal heart sounds.   Pulmonary:      Effort: Pulmonary effort is normal. No respiratory distress.      Breath sounds: Normal breath sounds.   Musculoskeletal:         General: Normal range of motion.      Cervical back: Normal range of motion.   Skin:     General: Skin is warm and dry.   Neurological:      General: No focal deficit present.      Mental Status: He " is alert.          Result Review :  The following data was reviewed by: EMANUEL Zuniga on 03/03/2025:  SARS Antigen   Date Value Ref Range Status   03/03/2025 Not Detected Not Detected, Presumptive Negative Final     Influenza A Antigen ART   Date Value Ref Range Status   03/03/2025 Detected (A) Not Detected Final     Influenza B Antigen ART   Date Value Ref Range Status   03/03/2025 Not Detected Not Detected Final              Assessment and Plan   Diagnoses and all orders for this visit:    1. Influenza A (Primary)    2. Flu-like symptoms  -     POCT SARS-CoV-2 Antigen ART + Flu    3. Antibiotic-induced yeast infection  -     fluconazole (Diflucan) 150 MG tablet; Take 1 tablet by mouth Every 3 (Three) Days.  Dispense: 3 tablet; Refill: 0      - Reviewed viral respiratory illness guidelines.   - Encouraged increased hydration with current illness.  - Continue over the counter treatments as needed for symptom management.   - Will provide diflucan with report of yeast infection with recent antibiotic use.            Follow Up   Return if symptoms worsen or fail to improve.  Patient was given instructions and counseling regarding his condition or for health maintenance advice. Please see specific information pulled into the AVS if appropriate.     Signed by:    EMANUEL Zuniga Date: 03/04/25

## 2025-03-06 DIAGNOSIS — R11.0 NAUSEA: ICD-10-CM

## 2025-03-06 RX ORDER — ONDANSETRON 4 MG/1
4 TABLET, ORALLY DISINTEGRATING ORAL EVERY 8 HOURS PRN
Qty: 30 TABLET | Refills: 0 | Status: SHIPPED | OUTPATIENT
Start: 2025-03-06

## 2025-03-06 NOTE — TELEPHONE ENCOUNTER
Pending Prescriptions:                       Disp   Refills    ondansetron ODT (ZOFRAN-ODT) 4 MG disinteg*30 tab*0        Sig: Take 1 tablet by mouth Every 8 (Eight) Hours As           Needed for Nausea or Vomiting.

## 2025-04-01 ENCOUNTER — OFFICE VISIT (OUTPATIENT)
Dept: FAMILY MEDICINE CLINIC | Facility: CLINIC | Age: 30
End: 2025-04-01
Payer: COMMERCIAL

## 2025-04-01 VITALS
DIASTOLIC BLOOD PRESSURE: 99 MMHG | SYSTOLIC BLOOD PRESSURE: 165 MMHG | OXYGEN SATURATION: 97 % | HEIGHT: 64 IN | TEMPERATURE: 98.4 F | WEIGHT: 298.8 LBS | BODY MASS INDEX: 51.01 KG/M2 | HEART RATE: 81 BPM | RESPIRATION RATE: 16 BRPM

## 2025-04-01 DIAGNOSIS — H65.01 RIGHT ACUTE SEROUS OTITIS MEDIA, RECURRENCE NOT SPECIFIED: ICD-10-CM

## 2025-04-01 DIAGNOSIS — R05.2 SUBACUTE COUGH: Primary | ICD-10-CM

## 2025-04-01 DIAGNOSIS — I10 HYPERTENSION, UNSPECIFIED TYPE: ICD-10-CM

## 2025-04-01 DIAGNOSIS — R51.9 ACUTE NONINTRACTABLE HEADACHE, UNSPECIFIED HEADACHE TYPE: ICD-10-CM

## 2025-04-01 PROCEDURE — 99214 OFFICE O/P EST MOD 30 MIN: CPT

## 2025-04-01 RX ORDER — CEFDINIR 300 MG/1
300 CAPSULE ORAL 2 TIMES DAILY
Qty: 14 CAPSULE | Refills: 0 | Status: SHIPPED | OUTPATIENT
Start: 2025-04-01 | End: 2025-04-08

## 2025-04-01 RX ORDER — LOSARTAN POTASSIUM 25 MG/1
25 TABLET ORAL DAILY
Qty: 30 TABLET | Refills: 0 | Status: SHIPPED | OUTPATIENT
Start: 2025-04-01

## 2025-04-01 NOTE — PROGRESS NOTES
Chief Complaint  Diarrhea, Vomiting, and Nausea    Subjective        Elias Schroeder presents to [facility]  History of Present Illness  Elias Schroeder started to feel bad Thursday night (5 days ago). Symptoms persisted over the weekend. Reports concern of allergy to sucralose. Had been drinking a sugar free drink mix all weekend and did not realize sucralose was in this. Experienced nausea and diarrhea with fatigue and weakness. Felt feverish but does not have a thermometer at home. Once realized drink mix contained sucralose had removed this so would not be used. Last used this mix last night.     Patient is also found to be hypertensive. Reports having had elevated blood pressures over the past several months at appointments.       Past Medical History:   Diagnosis Date    ADHD     Anxiety     Depression     Diabetes mellitus     Insomnia     Obesity     Polycystic ovarian syndrome     Prediabetes      Past Surgical History:   Procedure Laterality Date    TONSILLECTOMY       Social History     Socioeconomic History    Marital status: Significant Other   Tobacco Use    Smoking status: Former     Current packs/day: 0.00     Average packs/day: 0.3 packs/day for 5.0 years (1.3 ttl pk-yrs)     Types: Cigarettes, Pipe, Cigars     Start date: 2013     Quit date: 2018     Years since quittin.2     Passive exposure: Past    Smokeless tobacco: Never   Vaping Use    Vaping status: Every Day    Substances: Nicotine, Flavoring    Devices: Disposable    Passive vaping exposure: Yes   Substance and Sexual Activity    Alcohol use: Not Currently    Drug use: Yes     Types: Marijuana    Sexual activity: Yes     Partners: Female     Birth control/protection: None, Partner of same sex     Comment: I’m a Trans Man so biological speaking we are same sex     Family History   Problem Relation Age of Onset    Depression Mother     Diabetes Mother     Fibromyalgia Mother     Heart attack Mother      Anxiety disorder Mother     Heart disease Mother     Mental illness Mother     Vision loss Mother     Diabetes Father     Depression Father     Kidney failure Father     Lupus Father     Alcohol abuse Father     Anxiety disorder Father     Drug abuse Father     Hearing loss Father     Kidney disease Father     Mental illness Father     Other Father         lupus    Fibromyalgia Maternal Grandmother     Arthritis Maternal Grandmother     Early death Paternal Uncle        Medications:  Current Outpatient Medications   Medication Sig Dispense Refill    albuterol sulfate  (90 Base) MCG/ACT inhaler Inhale 2 puffs Every 4 (Four) to 6 (Six) Hours As Needed for Wheezing or Shortness of Air (or persistant cough.). 18 g 0    budesonide-formoterol (Symbicort) 160-4.5 MCG/ACT inhaler Inhale 2 puffs 2 (Two) Times a Day. 10.2 g 1    DULoxetine (Cymbalta) 60 MG capsule Take 1 capsule by mouth Daily. 30 capsule 2    Elastic Bandages & Supports (ACE Ankle Brace) misc Use 1 Device As Needed (ankle pain and swelling). 1 each 0    famotidine (Pepcid) 40 MG tablet Take 1 tablet by mouth Daily. 30 tablet 5    ferrous sulfate 324 (65 Fe) MG tablet delayed-release EC tablet Take 1 tablet by mouth Daily With Breakfast. 30 tablet 11    fluconazole (Diflucan) 150 MG tablet Take 1 tablet by mouth Every 3 (Three) Days. 3 tablet 0    fluticasone (FLONASE) 50 MCG/ACT nasal spray Administer 2 sprays into the nostril(s) as directed by provider Daily. 16 g 5    ondansetron ODT (ZOFRAN-ODT) 4 MG disintegrating tablet Take 1 tablet by mouth Every 8 (Eight) Hours As Needed for Nausea or Vomiting. 30 tablet 0    promethazine (PHENERGAN) 25 MG tablet Take 1 tablet by mouth Every 6 (Six) Hours As Needed for Nausea or Vomiting. 10 tablet 0    promethazine-dextromethorphan (PROMETHAZINE-DM) 6.25-15 MG/5ML syrup Take 5 mL by mouth 4 (Four) Times a Day As Needed for Cough. 240 mL 0    cefdinir (OMNICEF) 300 MG capsule Take 1 capsule by mouth 2 (Two)  "Times a Day for 7 days. 14 capsule 0    losartan (Cozaar) 25 MG tablet Take 1 tablet by mouth Daily. 30 tablet 0    vitamin D (ERGOCALCIFEROL) 1.25 MG (55868 UT) capsule capsule Take 1 capsule by mouth 1 (One) Time Per Week for 360 days. (Patient not taking: Reported on 1/17/2025) 12 capsule 0     No current facility-administered medications for this visit.       Review of Systems  Review of systems is negative unless otherwise specified in HPI.    Objective   Vital Signs:  /99 (BP Location: Left arm, Patient Position: Sitting, Cuff Size: Adult)   Pulse 81   Temp 98.4 °F (36.9 °C) (Infrared)   Resp 16   Ht 162.6 cm (64\")   Wt 136 kg (298 lb 12.8 oz)   SpO2 97%   BMI 51.29 kg/m²   Estimated body mass index is 51.29 kg/m² as calculated from the following:    Height as of this encounter: 162.6 cm (64\").    Weight as of this encounter: 136 kg (298 lb 12.8 oz).          Physical Exam  Vitals and nursing note reviewed.   Constitutional:       Appearance: He is morbidly obese. He is ill-appearing.   HENT:      Head: Normocephalic.      Right Ear: A middle ear effusion is present. Tympanic membrane is erythematous.      Left Ear: Tympanic membrane normal.      Nose: Congestion and rhinorrhea present.      Mouth/Throat:      Mouth: Mucous membranes are moist.      Pharynx: Oropharynx is clear. Posterior oropharyngeal erythema and postnasal drip present.   Eyes:      Pupils: Pupils are equal, round, and reactive to light.   Cardiovascular:      Rate and Rhythm: Normal rate and regular rhythm.      Pulses: Normal pulses.      Heart sounds: Normal heart sounds.   Pulmonary:      Effort: Pulmonary effort is normal. No respiratory distress.      Breath sounds: Normal breath sounds.   Abdominal:      General: Bowel sounds are normal.      Palpations: Abdomen is soft.   Musculoskeletal:         General: Normal range of motion.      Cervical back: Normal range of motion.   Skin:     General: Skin is warm and dry.      " Capillary Refill: Capillary refill takes less than 2 seconds.   Neurological:      General: No focal deficit present.      Mental Status: He is alert.          Result Review :               Assessment and Plan   Diagnoses and all orders for this visit:    1. Subacute cough (Primary)    2. Acute nonintractable headache, unspecified headache type    3. Right acute serous otitis media, recurrence not specified  -     cefdinir (OMNICEF) 300 MG capsule; Take 1 capsule by mouth 2 (Two) Times a Day for 7 days.  Dispense: 14 capsule; Refill: 0    4. Hypertension, unspecified type  -     losartan (Cozaar) 25 MG tablet; Take 1 tablet by mouth Daily.  Dispense: 30 tablet; Refill: 0      - Will treat with antibiotic for finding of otitis media.   - Patient to review foods/drinks to ensure does not contain sucralose with symptoms worsening when has this.   - With recurrent finding of hypertension will proceed with starting losartan. Discussed monitoring blood pressure at home and keep a log of this.            Follow Up   Return in about 4 weeks (around 4/29/2025) for Annual physical, blood pressure recheck..  Patient was given instructions and counseling regarding his condition or for health maintenance advice. Please see specific information pulled into the AVS if appropriate.     Signed by:    EMANUEL Zuniga Date: 04/03/25

## 2025-04-01 NOTE — LETTER
April 1, 2025     Patient: Elias Schroeder   YOB: 1995   Date of Visit: 4/1/2025       To Whom It May Concern:    Please excuse Elias Schroeder from work from 03/27/25 through 04/01/25.  Elias may return to work on 04/02/25.           Sincerely,        EMANUEL Zuniga

## 2025-04-02 ENCOUNTER — PATIENT MESSAGE (OUTPATIENT)
Dept: FAMILY MEDICINE CLINIC | Facility: CLINIC | Age: 30
End: 2025-04-02
Payer: COMMERCIAL

## 2025-04-02 NOTE — LETTER
April 8, 2025      [facility]  [address] [place] [address]  [phone]          Patient: Elias Schroeder   YOB: 1995   Date of Visit: 4/2/2025       To Whom It May Concern:    Elias Schroeder was seen at [facility] on 4/2/2025.    Please excuse from work 3/27/2025 through 4/7/2025.         Sincerely,     Remy Patel, APRN Date: 04/08/25

## 2025-04-02 NOTE — LETTER
April 7, 2025      [facility]  [address] [facility] [address]  [phone]        Patient: Elias Schroeder   YOB: 1995   Date of Visit: 4/2/2025       To Whom It May Concern:    Please excuse Elias Schroeder from work from 03/27/25 through 04/06/25.       Sincerely,       EMANUEL Zuniga

## 2025-04-16 ENCOUNTER — OFFICE VISIT (OUTPATIENT)
Dept: FAMILY MEDICINE CLINIC | Facility: CLINIC | Age: 30
End: 2025-04-16
Payer: COMMERCIAL

## 2025-04-16 VITALS
WEIGHT: 307 LBS | SYSTOLIC BLOOD PRESSURE: 133 MMHG | DIASTOLIC BLOOD PRESSURE: 85 MMHG | TEMPERATURE: 98 F | RESPIRATION RATE: 18 BRPM | HEART RATE: 69 BPM | BODY MASS INDEX: 52.41 KG/M2 | HEIGHT: 64 IN | OXYGEN SATURATION: 98 %

## 2025-04-16 DIAGNOSIS — E66.01 MORBID OBESITY WITH BODY MASS INDEX OF 50.0-59.9 IN ADULT: Chronic | ICD-10-CM

## 2025-04-16 DIAGNOSIS — E74.31 SUCROSE INTOLERANCE: ICD-10-CM

## 2025-04-16 DIAGNOSIS — E11.65 TYPE 2 DIABETES MELLITUS WITH HYPERGLYCEMIA, WITHOUT LONG-TERM CURRENT USE OF INSULIN: Primary | ICD-10-CM

## 2025-04-16 LAB — GLUCOSE BLDC GLUCOMTR-MCNC: 235 MG/DL (ref 70–130)

## 2025-04-16 RX ORDER — DAPAGLIFLOZIN 10 MG/1
10 TABLET, FILM COATED ORAL DAILY
Qty: 30 TABLET | Refills: 0 | Status: SHIPPED | OUTPATIENT
Start: 2025-04-16

## 2025-04-16 RX ORDER — DAPAGLIFLOZIN 5 MG/1
5 TABLET, FILM COATED ORAL DAILY
Qty: 30 TABLET | Refills: 0 | Status: CANCELLED | OUTPATIENT
Start: 2025-04-16

## 2025-04-16 NOTE — PROGRESS NOTES
Subjective     Chief Complaint   Patient presents with    Migraine    Blood Sugar Problem       History of Present Illness    Headaches, excessive sweating, excessive thirst, feels that sugar are running high.   Has gastric emptying test on 4/22/25.  Last ate chocolate covered strawberries about 2-3 hours ago.  Has a daily headache since stopping medication, has light sensitivity with it.  POC glucose is elevated in office today.  Patient is not taking any medication at this time for treatment of diabetes due to GI symptoms.      Developed an intolerance to sucrose.  Has nausea, fatigue, vomiting, diarrhea with it. Reads food labels to avoid ingesting.      Patient is not able to provide a urine sample at this time for urine microalbumin.  Patient advised that this will be needed at next appointment given patient's untreated diabetes and symptoms.      Patient's PMR from outside medical facility reviewed and noted.    Review of Systems     Otherwise complete ROS reviewed and negative except as mentioned in the HPI.    Past Medical History:   Past Medical History:   Diagnosis Date    ADHD     Anxiety     Depression     Diabetes mellitus     Insomnia     Obesity     Polycystic ovarian syndrome     Prediabetes      Past Surgical History:  Past Surgical History:   Procedure Laterality Date    TONSILLECTOMY       Social History:  reports that he quit smoking about 7 years ago. His smoking use included cigarettes, pipe, and cigars. He started smoking about 12 years ago. He has a 1.3 pack-year smoking history. He has been exposed to tobacco smoke. He has never used smokeless tobacco. He reports that he does not currently use alcohol. He reports current drug use. Drug: Marijuana.    Family History: family history includes Alcohol abuse in his father; Anxiety disorder in his father and mother; Arthritis in his maternal grandmother; Depression in his father and mother; Diabetes in his father and mother; Drug abuse in  his father; Early death in his paternal uncle; Fibromyalgia in his maternal grandmother and mother; Hearing loss in his father; Heart attack in his mother; Heart disease in his mother; Kidney disease in his father; Kidney failure in his father; Lupus in his father; Mental illness in his father and mother; Other in his father; Vision loss in his mother.      Allergies:  Allergies   Allergen Reactions    Cinnamon Swelling    Sucralose GI Intolerance    Latex Rash    Silicon Rash     Medications:  Prior to Admission medications    Medication Sig Start Date End Date Taking? Authorizing Provider   albuterol sulfate  (90 Base) MCG/ACT inhaler Inhale 2 puffs Every 4 (Four) to 6 (Six) Hours As Needed for Wheezing or Shortness of Air (or persistant cough.). 2/28/25  Yes Jonas Moreno MD   budesonide-formoterol (Symbicort) 160-4.5 MCG/ACT inhaler Inhale 2 puffs 2 (Two) Times a Day. 2/28/25  Yes Jonas Moreno MD   DULoxetine (Cymbalta) 60 MG capsule Take 1 capsule by mouth Daily. 2/12/25  Yes Karrie Samaniego APRN   Elastic Bandages & Supports (ACE Ankle Brace) [facility] Use 1 Device As Needed (ankle pain and swelling). 1/17/25  Yes Karrie Samaniego APRN   famotidine (Pepcid) 40 MG tablet Take 1 tablet by mouth Daily. 5/28/24  Yes Karrie Samaniego APRN   ferrous sulfate 324 (65 Fe) MG tablet delayed-release EC tablet Take 1 tablet by mouth Daily With Breakfast. 11/29/23  Yes Karrie Samaniego APRN   fluconazole (Diflucan) 150 MG tablet Take 1 tablet by mouth Every 3 (Three) Days. 3/3/25  Yes Remy Patel APRN   fluticasone (FLONASE) 50 MCG/ACT nasal spray Administer 2 sprays into the nostril(s) as directed by provider Daily. 2/21/25  Yes Jonas Moreno MD   losartan (Cozaar) 25 MG tablet Take 1 tablet by mouth Daily. 4/1/25  Yes Remy Patel APRN   ondansetron ODT (ZOFRAN-ODT) 4 MG disintegrating tablet Take 1 tablet by mouth Every 8 (Eight) Hours As Needed for  "Nausea or Vomiting. 3/6/25  Yes Remy Patel APRN   promethazine (PHENERGAN) 25 MG tablet Take 1 tablet by mouth Every 6 (Six) Hours As Needed for Nausea or Vomiting. 2/12/25  Yes Karrie Samaniego APRN   promethazine-dextromethorphan (PROMETHAZINE-DM) 6.25-15 MG/5ML syrup Take 5 mL by mouth 4 (Four) Times a Day As Needed for Cough. 2/28/25  Yes Jonas Moreno MD   vitamin D (ERGOCALCIFEROL) 1.25 MG (69916 UT) capsule capsule Take 1 capsule by mouth 1 (One) Time Per Week for 360 days.  Patient not taking: Reported on 4/16/2025 10/25/24 10/20/25  Karrie Samaniego APRN       Objective     Vital Signs: /85 (BP Location: Left arm, Patient Position: Sitting, Cuff Size: Adult)   Pulse 69   Temp 98 °F (36.7 °C) (Infrared)   Resp 18   Ht 162.6 cm (64\")   Wt (!) 139 kg (307 lb)   SpO2 98%   BMI 52.70 kg/m²   Physical Exam  Vitals and nursing note reviewed.   Constitutional:       Appearance: He is morbidly obese.   HENT:      Head: Normocephalic.   Cardiovascular:      Rate and Rhythm: Normal rate and regular rhythm.      Pulses: Normal pulses.      Heart sounds: Normal heart sounds.   Pulmonary:      Effort: Pulmonary effort is normal.      Breath sounds: Normal breath sounds.   Musculoskeletal:         General: Normal range of motion.   Skin:     General: Skin is warm and dry.   Neurological:      General: No focal deficit present.      Mental Status: He is alert and oriented to person, place, and time.   Psychiatric:         Mood and Affect: Mood normal.         Behavior: Behavior normal.       Advance Care Planning            Results Reviewed:  Glucose   Date Value Ref Range Status   05/01/2024 274 (H) 70 - 99 mg/dL Final     BUN   Date Value Ref Range Status   05/01/2024 8 6 - 20 mg/dL Final     Creatinine   Date Value Ref Range Status   05/01/2024 0.64 (L) 0.76 - 1.27 mg/dL Final     Sodium   Date Value Ref Range Status   05/01/2024 136 134 - 144 mmol/L Final     Potassium   Date " Value Ref Range Status   05/01/2024 4.1 3.5 - 5.2 mmol/L Final     Chloride   Date Value Ref Range Status   05/01/2024 100 96 - 106 mmol/L Final     Total CO2   Date Value Ref Range Status   05/01/2024 20 20 - 29 mmol/L Final     Calcium   Date Value Ref Range Status   05/01/2024 8.8 8.7 - 10.2 mg/dL Final     ALT (SGPT)   Date Value Ref Range Status   05/01/2024 28 0 - 44 IU/L Final     AST (SGOT)   Date Value Ref Range Status   05/01/2024 19 0 - 40 IU/L Final     WBC   Date Value Ref Range Status   05/01/2024 7.9 3.4 - 10.8 x10E3/uL Final     Hematocrit   Date Value Ref Range Status   05/01/2024 40.7 37.5 - 51.0 % Final     Platelets   Date Value Ref Range Status   05/01/2024 325 150 - 450 x10E3/uL Final     Triglycerides   Date Value Ref Range Status   11/27/2023 68 0 - 149 mg/dL Final     HDL Cholesterol   Date Value Ref Range Status   11/27/2023 71 >39 mg/dL Final     LDL Chol Calc (NIH)   Date Value Ref Range Status   11/27/2023 82 0 - 99 mg/dL Final     Hemoglobin A1C   Date Value Ref Range Status   08/14/2024 8.2 (A) 4.5 - 5.7 % Final         Assessment / Plan     Assessment/Plan     Diagnoses and all orders for this visit:    1. Type 2 diabetes mellitus with hyperglycemia, without long-term current use of insulin (Primary)  -     POC Glucose  -     Farxiga 10 MG tablet; Take 10 mg by mouth Daily.  Dispense: 30 tablet; Refill: 0    2. Sucrose intolerance    3. Morbid obesity with body mass index of 50.0-59.9 in adult    Will start patient on Farxiga 10 mg daily.  Patient encouraged to minimize carbs and sugar in diet.  Follow up at upcoming appointment in 2 weeks.             An After Visit Summary was printed and given to the patient at discharge.  Return for Next scheduled follow up.    I have discussed the patient results/orders and plan/recommendation with them at today's visit.      Karrie Samaniego, EMANUEL   04/16/2025

## 2025-04-16 NOTE — LETTER
April 16, 2025     Patient: Elias Schroeder   YOB: 1995   Date of Visit: 4/16/2025       To Whom It May Concern:    It is my medical opinion that Elias Schroeder  Please excuse from work for the following dates: 4/15/25 and 4/16/25 .           Sincerely,        Karrie Samaniego APRN

## 2025-04-17 ENCOUNTER — PRIOR AUTHORIZATION (OUTPATIENT)
Dept: FAMILY MEDICINE CLINIC | Facility: CLINIC | Age: 30
End: 2025-04-17
Payer: COMMERCIAL

## 2025-04-17 NOTE — TELEPHONE ENCOUNTER
DANISH GUTIERREZ (Key: [id number])  PA Case ID #: [id number]  Rx #: [id number]  Need Help? Call us at [phone]  Outcome  Approved today by [facility] 2017  Your PA request has been approved. Additional information will be provided in the approval communication. (Message 1149)  Effective Date: 4/17/2025  Authorization Expiration Date: 4/16/2028  Drug  Farxiga 10MG tablets  ePA cloud logo  Form  Sabrina Electronic PA Form (2017 [employer])  Original Claim Info  76,75

## 2025-04-23 DIAGNOSIS — R10.84 GENERALIZED ABDOMINAL PAIN: ICD-10-CM

## 2025-04-23 DIAGNOSIS — R19.8 ABDOMINAL FULLNESS: ICD-10-CM

## 2025-04-23 DIAGNOSIS — R11.2 NAUSEA AND VOMITING, UNSPECIFIED VOMITING TYPE: ICD-10-CM

## 2025-04-23 DIAGNOSIS — R14.0 BLOATING: ICD-10-CM

## 2025-05-07 DIAGNOSIS — I10 HYPERTENSION, UNSPECIFIED TYPE: ICD-10-CM

## 2025-05-07 RX ORDER — LOSARTAN POTASSIUM 25 MG/1
25 TABLET ORAL DAILY
Qty: 30 TABLET | Refills: 0 | Status: SHIPPED | OUTPATIENT
Start: 2025-05-07

## 2025-05-07 NOTE — TELEPHONE ENCOUNTER
Requested Prescriptions     Pending Prescriptions Disp Refills    losartan (COZAAR) 25 MG tablet [Pharmacy Med Name: Losartan Potassium 25 MG Oral Tablet] 30 tablet 0     Sig: Take 1 tablet by mouth once daily

## 2025-05-29 ENCOUNTER — TELEPHONE (OUTPATIENT)
Dept: FAMILY MEDICINE CLINIC | Facility: CLINIC | Age: 30
End: 2025-05-29
Payer: COMMERCIAL

## 2025-05-29 NOTE — TELEPHONE ENCOUNTER
Attempted to call patient to scheduled physical with fasting labs at the end of June, no answer from patient. LVM to call office back and schedule. Sending [employer] message as well.

## 2025-06-16 ENCOUNTER — OFFICE VISIT (OUTPATIENT)
Dept: FAMILY MEDICINE CLINIC | Facility: CLINIC | Age: 30
End: 2025-06-16

## 2025-06-16 VITALS
BODY MASS INDEX: 52.99 KG/M2 | TEMPERATURE: 98.4 F | SYSTOLIC BLOOD PRESSURE: 159 MMHG | OXYGEN SATURATION: 98 % | WEIGHT: 310.4 LBS | DIASTOLIC BLOOD PRESSURE: 97 MMHG | HEIGHT: 64 IN | HEART RATE: 96 BPM

## 2025-06-16 DIAGNOSIS — M25.50 MULTIPLE JOINT PAIN: ICD-10-CM

## 2025-06-16 DIAGNOSIS — G47.00 INSOMNIA, UNSPECIFIED TYPE: ICD-10-CM

## 2025-06-16 DIAGNOSIS — G43.E19 INTRACTABLE CHRONIC MIGRAINE WITH AURA AND WITHOUT STATUS MIGRAINOSUS: ICD-10-CM

## 2025-06-16 DIAGNOSIS — F33.2 SEVERE EPISODE OF RECURRENT MAJOR DEPRESSIVE DISORDER, WITHOUT PSYCHOTIC FEATURES: ICD-10-CM

## 2025-06-16 DIAGNOSIS — E11.65 TYPE 2 DIABETES MELLITUS WITH HYPERGLYCEMIA, WITHOUT LONG-TERM CURRENT USE OF INSULIN: Primary | ICD-10-CM

## 2025-06-16 DIAGNOSIS — R10.13 DYSPEPSIA: ICD-10-CM

## 2025-06-16 LAB
EXPIRATION DATE: ABNORMAL
HBA1C MFR BLD: 9.6 % (ref 4.5–5.7)
Lab: ABNORMAL

## 2025-06-16 RX ORDER — DAPAGLIFLOZIN 10 MG/1
10 TABLET, FILM COATED ORAL DAILY
Qty: 30 TABLET | Refills: 5 | Status: SHIPPED | OUTPATIENT
Start: 2025-06-16

## 2025-06-16 RX ORDER — QUETIAPINE FUMARATE 50 MG/1
50 TABLET, FILM COATED ORAL NIGHTLY
Qty: 30 TABLET | Refills: 5 | Status: SHIPPED | OUTPATIENT
Start: 2025-06-16

## 2025-06-16 RX ORDER — RIZATRIPTAN BENZOATE 10 MG/1
10 TABLET, ORALLY DISINTEGRATING ORAL ONCE AS NEEDED
Qty: 18 TABLET | Refills: 5 | Status: SHIPPED | OUTPATIENT
Start: 2025-06-16

## 2025-06-16 RX ORDER — FAMOTIDINE 40 MG/1
40 TABLET, FILM COATED ORAL DAILY
Qty: 30 TABLET | Refills: 5 | Status: SHIPPED | OUTPATIENT
Start: 2025-06-16

## 2025-06-16 RX ORDER — DULOXETIN HYDROCHLORIDE 60 MG/1
60 CAPSULE, DELAYED RELEASE ORAL DAILY
Qty: 30 CAPSULE | Refills: 5 | Status: SHIPPED | OUTPATIENT
Start: 2025-06-16

## 2025-06-16 RX ORDER — METFORMIN HYDROCHLORIDE 500 MG/1
2000 TABLET, EXTENDED RELEASE ORAL
Qty: 120 TABLET | Refills: 2 | Status: SHIPPED | OUTPATIENT
Start: 2025-06-16 | End: 2025-09-14

## 2025-06-16 NOTE — PROGRESS NOTES
Subjective     Chief Complaint   Patient presents with    Toe Pain    Nail Problem    Diabetes       Toe Pain     Diabetes    Patient presents today for toe pain and thickened nails that are brittle with trimming.  This is located on his second toe of both feet..      Follow up on diabetes.  A1c is worse, previously 8.2 and is currently 9.6.  Testing was negative for gastroparesis.  Still having nausea when stopping metformin.  Patient has resumed metformin but did not start Farxiga.  Currently between insurances.    Not sleeping well.  Sleeping about 6 hours a night.   Trouble falling asleep and staying asleep.      Needing all medications refilled    Patient's PMR from outside medical facility reviewed and noted.    Review of Systems     Otherwise complete ROS reviewed and negative except as mentioned in the HPI.    Past Medical History:   Past Medical History:   Diagnosis Date    ADHD     Anxiety     Depression     Diabetes mellitus     Insomnia     Obesity     Polycystic ovarian syndrome     Prediabetes      Past Surgical History:  Past Surgical History:   Procedure Laterality Date    TONSILLECTOMY       Social History:  reports that he quit smoking about 7 years ago. His smoking use included cigarettes, pipe, and cigars. He started smoking about 12 years ago. He has a 1.3 pack-year smoking history. He has been exposed to tobacco smoke. He has never used smokeless tobacco. He reports that he does not currently use alcohol. He reports current drug use. Drug: Marijuana.    Family History: family history includes Alcohol abuse in his father; Anxiety disorder in his father and mother; Arthritis in his maternal grandmother; Depression in his father and mother; Diabetes in his father and mother; Drug abuse in his father; Early death in his paternal uncle; Fibromyalgia in his maternal grandmother and mother; Hearing loss in his father; Heart attack in his mother; Heart disease in his mother; Kidney disease in  his father; Kidney failure in his father; Lupus in his father; Mental illness in his father and mother; Other in his father; Vision loss in his mother.      Allergies:  Allergies   Allergen Reactions    Cinnamon Swelling    Sucralose GI Intolerance    Latex Rash    Silicon Rash     Medications:  Prior to Admission medications    Medication Sig Start Date End Date Taking? Authorizing Provider   DULoxetine (Cymbalta) 60 MG capsule Take 1 capsule by mouth Daily. 2/12/25  Yes Karrie Samaniego APRN   ferrous sulfate 324 (65 Fe) MG tablet delayed-release EC tablet Take 1 tablet by mouth Daily With Breakfast. 11/29/23  Yes Karrie Samaniego APRN   losartan (COZAAR) 25 MG tablet Take 1 tablet by mouth once daily 5/7/25  Yes Karrie Samaniego APRN   albuterol sulfate  (90 Base) MCG/ACT inhaler Inhale 2 puffs Every 4 (Four) to 6 (Six) Hours As Needed for Wheezing or Shortness of Air (or persistant cough.). 2/28/25   Jonas Moreno MD   budesonide-formoterol (Symbicort) 160-4.5 MCG/ACT inhaler Inhale 2 puffs 2 (Two) Times a Day. 2/28/25   Jonas Moreno MD   Elastic Bandages & Supports (ACE Ankle Brace) misc Use 1 Device As Needed (ankle pain and swelling). 1/17/25   Karrie Samaniego APRN   famotidine (Pepcid) 40 MG tablet Take 1 tablet by mouth Daily. 5/28/24   Karrie Samaniego APRN   Farxiga 10 MG tablet Take 10 mg by mouth Daily.  Patient not taking: Reported on 6/16/2025 4/16/25   Karrie Samaniego APRN   fluticasone (FLONASE) 50 MCG/ACT nasal spray Administer 2 sprays into the nostril(s) as directed by provider Daily. 2/21/25   Jonas Moreno MD   ondansetron ODT (ZOFRAN-ODT) 4 MG disintegrating tablet Take 1 tablet by mouth Every 8 (Eight) Hours As Needed for Nausea or Vomiting. 3/6/25   Remy Patel APRN   promethazine (PHENERGAN) 25 MG tablet Take 1 tablet by mouth Every 6 (Six) Hours As Needed for Nausea or Vomiting. 2/12/25   Karrie Samaniego  "EMANUEL Contreras   rizatriptan MLT (MAXALT-MLT) 10 MG disintegrating tablet Place 1 tablet on the tongue 1 (One) Time As Needed for Migraine. May repeat in 2 hours if needed for 1 additional dose.  Do not take more than 2 doses in 24 hours. 4/21/25   Karrie Samaniego APRN   vitamin D (ERGOCALCIFEROL) 1.25 MG (28978 UT) capsule capsule Take 1 capsule by mouth 1 (One) Time Per Week for 360 days.  Patient not taking: Reported on 4/16/2025 10/25/24 10/20/25  Karrie Samaniego APRN       RADHA:      PHQ-9 Depression Screening  Little interest or pleasure in doing things? Not at all   Feeling down, depressed, or hopeless? Not at all   PHQ-2 Total Score 0   Trouble falling or staying asleep, or sleeping too much?     Feeling tired or having little energy?     Poor appetite or overeating?     Feeling bad about yourself - or that you are a failure or have let yourself or your family down?     Trouble concentrating on things, such as reading the newspaper or watching television?     Moving or speaking so slowly that other people could have noticed? Or the opposite - being so fidgety or restless that you have been moving around a lot more than usual?     Thoughts that you would be better off dead, or of hurting yourself in some way?     PHQ-9 Total Score     If you checked off any problems, how difficult have these problems made it for you to do your work, take care of things at home, or get along with other people? Not difficult at all       PHQ-9 Total Score:   0  0 (Negative screening for depression)  Support given, observe for worsening symptoms    Objective     Vital Signs: /97 (BP Location: Left arm, Patient Position: Sitting, Cuff Size: Large Adult)   Pulse 96   Temp 98.4 °F (36.9 °C) (Infrared)   Ht 162.6 cm (64.02\")   Wt (!) 141 kg (310 lb 6.4 oz)   SpO2 98%   BMI 53.25 kg/m²   Physical Exam  Vitals and nursing note reviewed.   Constitutional:       Appearance: He is morbidly obese.   HENT: "      Head: Normocephalic.   Cardiovascular:      Rate and Rhythm: Normal rate and regular rhythm.      Pulses: Normal pulses.      Heart sounds: Normal heart sounds.   Pulmonary:      Effort: Pulmonary effort is normal.      Breath sounds: Normal breath sounds.   Musculoskeletal:         General: Normal range of motion.   Feet:      Right foot:      Toenail Condition: Right toenails are abnormally thick.      Left foot:      Toenail Condition: Left toenails are abnormally thick.   Skin:     General: Skin is warm and dry.   Neurological:      General: No focal deficit present.      Mental Status: He is alert and oriented to person, place, and time.   Psychiatric:         Mood and Affect: Mood normal.         Behavior: Behavior normal.         Advance Care Planning            Result Review :             Assessment / Plan     Assessment/Plan     Diagnoses and all orders for this visit:    1. Type 2 diabetes mellitus with hyperglycemia, without long-term current use of insulin (Primary)  Assessment & Plan:  Diabetes is worsening.   Continue with metformin XR 2000 mg daily.  Start Farxiga 10 mg daily.  Discussed with patient that we will look at GLP-1 when new insurance begins.  Discussed with patient that if A1c is above 10 we will have to start insulin.  Patient reminded to keep checking blood sugar daily if they have not been regularly compliant with this because battery needs to be replaced and glucometer.  Diabetes will be reassessed in 3 months    Orders:  -     POC Glycosylated Hemoglobin (Hb A1C)  -     Microalbumin / Creatinine Urine Ratio - Urine, Clean Catch  -     Farxiga 10 MG tablet; Take 10 mg by mouth Daily.  Dispense: 30 tablet; Refill: 5  -     metFORMIN ER (GLUCOPHAGE-XR) 500 MG 24 hr tablet; Take 4 tablets by mouth Daily With Breakfast for 90 days.  Dispense: 120 tablet; Refill: 2    2. Dyspepsia  -     famotidine (Pepcid) 40 MG tablet; Take 1 tablet by mouth Daily.  Dispense: 30 tablet; Refill:  5    3. Intractable chronic migraine with aura and without status migrainosus  -     rizatriptan MLT (MAXALT-MLT) 10 MG disintegrating tablet; Place 1 tablet on the tongue 1 (One) Time As Needed for Migraine. May repeat in 2 hours if needed for 1 additional dose.  Do not take more than 2 doses in 24 hours.  Dispense: 18 tablet; Refill: 5    4. Multiple joint pain  -     DULoxetine (Cymbalta) 60 MG capsule; Take 1 capsule by mouth Daily.  Dispense: 30 capsule; Refill: 5    5. Severe episode of recurrent major depressive disorder, without psychotic features  -     DULoxetine (Cymbalta) 60 MG capsule; Take 1 capsule by mouth Daily.  Dispense: 30 capsule; Refill: 5    6. Insomnia, unspecified type  -     QUEtiapine (SEROquel) 50 MG tablet; Take 1 tablet by mouth Every Night.  Dispense: 30 tablet; Refill: 5               An After Visit Summary was printed and given to the patient at discharge.  Return in about 3 months (around 9/16/2025) for Annual physical.    I have discussed the patient results/orders and plan/recommendation with them at today's visit.      Karrie Samaniego, EMANUEL   06/16/2025

## 2025-06-16 NOTE — PROGRESS NOTES
Capillary Blood Specimen Collection  Capillary blood collection performed in [facility] by Maryann Singh CMA. Patient tolerated the procedure well without complications.   06/16/25   Maryann Singh CMA

## 2025-06-16 NOTE — ASSESSMENT & PLAN NOTE
Diabetes is worsening.   Continue with metformin XR 2000 mg daily.  Start Farxiga 10 mg daily.  Discussed with patient that we will look at GLP-1 when new insurance begins.  Discussed with patient that if A1c is above 10 we will have to start insulin.  Patient reminded to keep checking blood sugar daily if they have not been regularly compliant with this because battery needs to be replaced and glucometer.  Diabetes will be reassessed in 3 months

## 2025-06-17 LAB
ALBUMIN/CREAT UR: 14 MG/G CREAT (ref 0–29)
CREAT UR-MCNC: 164.7 MG/DL
MICROALBUMIN UR-MCNC: 23.4 UG/ML

## 2025-07-15 ENCOUNTER — OFFICE VISIT (OUTPATIENT)
Dept: FAMILY MEDICINE CLINIC | Facility: CLINIC | Age: 30
End: 2025-07-15

## 2025-07-15 VITALS
WEIGHT: 312.4 LBS | HEIGHT: 64 IN | DIASTOLIC BLOOD PRESSURE: 91 MMHG | BODY MASS INDEX: 53.33 KG/M2 | TEMPERATURE: 97.8 F | OXYGEN SATURATION: 97 % | HEART RATE: 93 BPM | SYSTOLIC BLOOD PRESSURE: 134 MMHG

## 2025-07-15 DIAGNOSIS — R11.0 NAUSEA: Primary | ICD-10-CM

## 2025-07-15 DIAGNOSIS — I10 HYPERTENSION, UNSPECIFIED TYPE: ICD-10-CM

## 2025-07-15 PROCEDURE — 99213 OFFICE O/P EST LOW 20 MIN: CPT

## 2025-07-15 RX ORDER — ONDANSETRON 4 MG/1
4 TABLET, ORALLY DISINTEGRATING ORAL EVERY 8 HOURS PRN
Qty: 30 TABLET | Refills: 1 | Status: SHIPPED | OUTPATIENT
Start: 2025-07-15

## 2025-07-15 RX ORDER — LOSARTAN POTASSIUM 25 MG/1
25 TABLET ORAL DAILY
Qty: 30 TABLET | Refills: 2 | Status: SHIPPED | OUTPATIENT
Start: 2025-07-15

## 2025-07-15 NOTE — PROGRESS NOTES
Chief Complaint  Vomiting (Fever//), Headache, Diarrhea, and Fever    Subjective        Elias Schroeder presents to [facility]  History of Present Illness  Patient is a 29-year-old male presenting today with complaints of vomiting an diarrhea. He had started to feel nauseous yesterday at work. Describes nausea feeling acidic like something wasn't digesting. Once home started vomiting. Diarrhea had started while at work during lunch break. Diarrhea is improving but nausea is not. Has not vomited today but feels like something is in his throat and could vomit.     Also had a headache yesterday that has persisted. Has tried taking ibuprofen and tylenol with no noticeable improvement.     Further needs refill on losartan. Reports blood pressure has been improving with this. Has recording from home on his phone with blood pressure running 120's systolic over 70-80's diastolic.       Past Medical History:   Diagnosis Date    ADHD     Anxiety     Depression     Diabetes mellitus     Insomnia     Obesity     Polycystic ovarian syndrome     Prediabetes      Past Surgical History:   Procedure Laterality Date    TONSILLECTOMY       Social History     Socioeconomic History    Marital status: Significant Other   Tobacco Use    Smoking status: Former     Current packs/day: 0.00     Average packs/day: 0.3 packs/day for 5.0 years (1.3 ttl pk-yrs)     Types: Cigarettes, Pipe, Cigars     Start date: 2013     Quit date: 2018     Years since quittin.5     Passive exposure: Past    Smokeless tobacco: Never   Vaping Use    Vaping status: Every Day    Substances: Nicotine, Flavoring    Devices: Disposable    Passive vaping exposure: Yes   Substance and Sexual Activity    Alcohol use: Yes     Comment: rarely    Drug use: Yes     Types: Marijuana    Sexual activity: Yes     Partners: Female     Birth control/protection: None, Partner of same sex     Comment: I’m a Trans Man so biological speaking  we are same sex     Family History   Problem Relation Age of Onset    Depression Mother     Diabetes Mother     Fibromyalgia Mother     Heart attack Mother     Anxiety disorder Mother     Heart disease Mother     Mental illness Mother     Vision loss Mother     Diabetes Father     Depression Father     Kidney failure Father     Lupus Father     Alcohol abuse Father     Anxiety disorder Father     Drug abuse Father     Hearing loss Father     Kidney disease Father     Mental illness Father     Other Father         lupus    Fibromyalgia Maternal Grandmother     Arthritis Maternal Grandmother     Early death Paternal Uncle        Medications:  Current Outpatient Medications   Medication Sig Dispense Refill    losartan (COZAAR) 25 MG tablet Take 1 tablet by mouth Daily. 30 tablet 2    ondansetron ODT (ZOFRAN-ODT) 4 MG disintegrating tablet Take 1 tablet by mouth Every 8 (Eight) Hours As Needed for Nausea or Vomiting. 30 tablet 1    albuterol sulfate  (90 Base) MCG/ACT inhaler Inhale 2 puffs Every 4 (Four) to 6 (Six) Hours As Needed for Wheezing or Shortness of Air (or persistant cough.). 18 g 0    budesonide-formoterol (Symbicort) 160-4.5 MCG/ACT inhaler Inhale 2 puffs 2 (Two) Times a Day. 10.2 g 1    DULoxetine (Cymbalta) 60 MG capsule Take 1 capsule by mouth Daily. 30 capsule 5    Elastic Bandages & Supports (ACE Ankle Brace) misc Use 1 Device As Needed (ankle pain and swelling). 1 each 0    famotidine (Pepcid) 40 MG tablet Take 1 tablet by mouth Daily. 30 tablet 5    Farxiga 10 MG tablet Take 10 mg by mouth Daily. 30 tablet 5    ferrous sulfate 324 (65 Fe) MG tablet delayed-release EC tablet Take 1 tablet by mouth Daily With Breakfast. 30 tablet 11    fluticasone (FLONASE) 50 MCG/ACT nasal spray Administer 2 sprays into the nostril(s) as directed by provider Daily. 16 g 5    glipizide (Glucotrol XL) 10 MG 24 hr tablet Take 1 tablet by mouth Daily. 30 tablet 2    metFORMIN ER (GLUCOPHAGE-XR) 500 MG 24 hr tablet  "Take 4 tablets by mouth Daily With Breakfast for 90 days. 120 tablet 2    promethazine (PHENERGAN) 25 MG tablet Take 1 tablet by mouth Every 6 (Six) Hours As Needed for Nausea or Vomiting. 10 tablet 0    QUEtiapine (SEROquel) 50 MG tablet Take 1 tablet by mouth Every Night. 30 tablet 5    rizatriptan MLT (MAXALT-MLT) 10 MG disintegrating tablet Place 1 tablet on the tongue 1 (One) Time As Needed for Migraine. May repeat in 2 hours if needed for 1 additional dose.  Do not take more than 2 doses in 24 hours. (Patient not taking: Reported on 7/15/2025) 18 tablet 5     No current facility-administered medications for this visit.       Review of Systems  Review of systems is negative unless otherwise specified in HPI.    Objective   Vital Signs:  /91 (BP Location: Left arm, Patient Position: Sitting, Cuff Size: Large Adult)   Pulse 93   Temp 97.8 °F (36.6 °C) (Infrared)   Ht 162.6 cm (64.02\")   Wt (!) 142 kg (312 lb 6.4 oz)   SpO2 97%   BMI 53.60 kg/m²   Estimated body mass index is 53.6 kg/m² as calculated from the following:    Height as of this encounter: 162.6 cm (64.02\").    Weight as of this encounter: 142 kg (312 lb 6.4 oz).       Class 3 Severe Obesity (BMI >=40). Obesity-related health conditions include the following: hypertension and diabetes mellitus. Obesity is unchanged. BMI is is above average; BMI management plan is completed. We discussed portion control and increasing exercise.      Physical Exam  Vitals and nursing note reviewed.   Constitutional:       General: He is not in acute distress.     Appearance: He is obese. He is ill-appearing.   HENT:      Head: Normocephalic.      Nose: Nose normal.      Mouth/Throat:      Mouth: Mucous membranes are moist.   Cardiovascular:      Rate and Rhythm: Normal rate and regular rhythm.      Pulses: Normal pulses.      Heart sounds: Normal heart sounds.   Pulmonary:      Effort: Pulmonary effort is normal. No respiratory distress.      Breath sounds: " Normal breath sounds.   Abdominal:      General: Bowel sounds are normal.      Palpations: Abdomen is soft.   Musculoskeletal:         General: Normal range of motion.      Cervical back: Normal range of motion.   Skin:     General: Skin is warm and dry.   Neurological:      General: No focal deficit present.      Mental Status: He is alert.          Result Review :               Assessment and Plan   Diagnoses and all orders for this visit:    1. Nausea (Primary)  -     ondansetron ODT (ZOFRAN-ODT) 4 MG disintegrating tablet; Take 1 tablet by mouth Every 8 (Eight) Hours As Needed for Nausea or Vomiting.  Dispense: 30 tablet; Refill: 1    2. Hypertension, unspecified type  -     losartan (COZAAR) 25 MG tablet; Take 1 tablet by mouth Daily.  Dispense: 30 tablet; Refill: 2      -Maintain hydration by drinking small amounts of clear fluids frequently, then soft diet, and then advance diet as tolerated. May use OTC Imodium if desired for any diarrhea.  Call if symptoms worsen, high fever, severe weakness or fainting, increased abdominal pain, blood in stool or vomit, or failure to improve in 2-3 days.  -Hypertension is reportedly reasonably controlled and stable. Will continue current dose of losartan at 25 mg.            Follow Up   Return for Annual physical.  Patient was given instructions and counseling regarding his condition or for health maintenance advice. Please see specific information pulled into the AVS if appropriate.     Signed by:    EMANUEL Zuniga Date: 07/15/25

## 2025-07-15 NOTE — LETTER
July 15, 2025     Patient: Elias Schroeder   YOB: 1995   Date of Visit: 7/15/2025       To Whom It May Concern:    Please excuse from work 7/15/2025.          Sincerely,    Remy Patel, EMANUEL Date: 07/15/25

## 2025-07-22 ENCOUNTER — OFFICE VISIT (OUTPATIENT)
Dept: FAMILY MEDICINE CLINIC | Facility: CLINIC | Age: 30
End: 2025-07-22
Payer: MEDICAID

## 2025-07-22 VITALS
OXYGEN SATURATION: 98 % | HEART RATE: 87 BPM | DIASTOLIC BLOOD PRESSURE: 87 MMHG | SYSTOLIC BLOOD PRESSURE: 153 MMHG | BODY MASS INDEX: 53.61 KG/M2 | WEIGHT: 314 LBS | HEIGHT: 64 IN

## 2025-07-22 DIAGNOSIS — G43.019 INTRACTABLE MIGRAINE WITHOUT AURA AND WITHOUT STATUS MIGRAINOSUS: Primary | ICD-10-CM

## 2025-07-22 DIAGNOSIS — I10 HYPERTENSION, UNSPECIFIED TYPE: ICD-10-CM

## 2025-07-22 DIAGNOSIS — M25.562 ACUTE PAIN OF LEFT KNEE: ICD-10-CM

## 2025-07-22 PROCEDURE — 96372 THER/PROPH/DIAG INJ SC/IM: CPT

## 2025-07-22 PROCEDURE — 3046F HEMOGLOBIN A1C LEVEL >9.0%: CPT

## 2025-07-22 PROCEDURE — 1160F RVW MEDS BY RX/DR IN RCRD: CPT

## 2025-07-22 PROCEDURE — 1159F MED LIST DOCD IN RCRD: CPT

## 2025-07-22 PROCEDURE — 1125F AMNT PAIN NOTED PAIN PRSNT: CPT

## 2025-07-22 PROCEDURE — 99214 OFFICE O/P EST MOD 30 MIN: CPT

## 2025-07-22 RX ORDER — SUMATRIPTAN 50 MG/1
TABLET, FILM COATED ORAL
Qty: 10 TABLET | Refills: 0 | Status: SHIPPED | OUTPATIENT
Start: 2025-07-22

## 2025-07-22 RX ORDER — KETOROLAC TROMETHAMINE 30 MG/ML
60 INJECTION, SOLUTION INTRAMUSCULAR; INTRAVENOUS ONCE
Status: DISCONTINUED | OUTPATIENT
Start: 2025-07-22 | End: 2025-07-22

## 2025-07-22 RX ORDER — KETOROLAC TROMETHAMINE 30 MG/ML
60 INJECTION, SOLUTION INTRAMUSCULAR; INTRAVENOUS ONCE
Status: COMPLETED | OUTPATIENT
Start: 2025-07-22 | End: 2025-07-22

## 2025-07-22 RX ADMIN — KETOROLAC TROMETHAMINE 60 MG: 30 INJECTION, SOLUTION INTRAMUSCULAR; INTRAVENOUS at 10:57

## 2025-07-22 NOTE — PROGRESS NOTES
"Chief Complaint  Knee Pain (New issues with left knee. Weight bearing, and pain with lying down/ resting. Experiencing pain unless propped up. Saw Dr Aranda with ortho at [facility] in the past, got steroid shot, after a week got painful again.) and Migraine (Having same issues as prior visit. Headace from last visit turned into painful migraine, every movement was painful. At least week since last appt/He has to go back to work after appt, so unsure if he can get shot today)    Subjective        Elias Schroeder presents to [facility]  History of Present Illness  Patient presents today with complaints of migraine and knee pain.   Since appointment 7/15/25 diarrhea and nausea has improved. Headache had worsened into a migraine. Has tried staying hydrated and otc treatments with minimal relief. Migraine has come and gone over the past week. Has been sensitive to light and sound. Pain is located frontal and described as constant dull with sharp pains when aggravated. Has previously been prescribed maxalt but was not able to get due to not being covered.     Reports left knee \"went down\" last week. Had been having some pain in it for the past 2-3 weeks. Has a history or right knee issue and had seen Dr. Aranda in the past. Left knee has been swollen. When it \"went down\" couldn't walk well but is improved with rest for a few days and is now able to walk but not normal. No known injury.     Blood pressure is found to remain elevated. Reports having been taking losartan as prescribed. Feels like blood pressure is elevated with having migraine.       Past Medical History:   Diagnosis Date    ADHD     Anxiety     Depression     Diabetes mellitus     Insomnia     Obesity     Polycystic ovarian syndrome     Prediabetes      Past Surgical History:   Procedure Laterality Date    TONSILLECTOMY       Social History     Socioeconomic History    Marital status: Significant Other   Tobacco Use    Smoking " status: Former     Current packs/day: 0.00     Average packs/day: 0.3 packs/day for 5.0 years (1.3 ttl pk-yrs)     Types: Cigarettes, Pipe, Cigars     Start date: 2013     Quit date: 2018     Years since quittin.5     Passive exposure: Past    Smokeless tobacco: Never   Vaping Use    Vaping status: Every Day    Substances: Nicotine, Flavoring    Devices: Disposable    Passive vaping exposure: Yes   Substance and Sexual Activity    Alcohol use: Yes     Comment: rarely    Drug use: Yes     Types: Marijuana    Sexual activity: Yes     Partners: Female     Birth control/protection: None, Partner of same sex     Comment: I’m a Trans Man so biological speaking we are same sex     Family History   Problem Relation Age of Onset    Depression Mother     Diabetes Mother     Fibromyalgia Mother     Heart attack Mother     Anxiety disorder Mother     Heart disease Mother     Mental illness Mother     Vision loss Mother     Diabetes Father     Depression Father     Kidney failure Father     Lupus Father     Alcohol abuse Father     Anxiety disorder Father     Drug abuse Father     Hearing loss Father     Kidney disease Father     Mental illness Father     Other Father         lupus    Fibromyalgia Maternal Grandmother     Arthritis Maternal Grandmother     Early death Paternal Uncle        Medications:  Current Outpatient Medications   Medication Sig Dispense Refill    albuterol sulfate  (90 Base) MCG/ACT inhaler Inhale 2 puffs Every 4 (Four) to 6 (Six) Hours As Needed for Wheezing or Shortness of Air (or persistant cough.). 18 g 0    budesonide-formoterol (Symbicort) 160-4.5 MCG/ACT inhaler Inhale 2 puffs 2 (Two) Times a Day. 10.2 g 1    DULoxetine (Cymbalta) 60 MG capsule Take 1 capsule by mouth Daily. 30 capsule 5    Elastic Bandages & Supports (ACE Ankle Brace) misc Use 1 Device As Needed (ankle pain and swelling). 1 each 0    famotidine (Pepcid) 40 MG tablet Take 1 tablet by mouth Daily. (Patient taking  "differently: Take 1 tablet by mouth Daily. prn) 30 tablet 5    Farxiga 10 MG tablet Take 10 mg by mouth Daily. 30 tablet 5    ferrous sulfate 324 (65 Fe) MG tablet delayed-release EC tablet Take 1 tablet by mouth Daily With Breakfast. (Patient taking differently: Take 1 tablet by mouth Daily With Breakfast. Prn- upset stomach) 30 tablet 11    fluticasone (FLONASE) 50 MCG/ACT nasal spray Administer 2 sprays into the nostril(s) as directed by provider Daily. 16 g 5    glipizide (Glucotrol XL) 10 MG 24 hr tablet Take 1 tablet by mouth Daily. 30 tablet 2    losartan (COZAAR) 25 MG tablet Take 1 tablet by mouth Daily. 30 tablet 2    metFORMIN ER (GLUCOPHAGE-XR) 500 MG 24 hr tablet Take 4 tablets by mouth Daily With Breakfast for 90 days. 120 tablet 2    ondansetron ODT (ZOFRAN-ODT) 4 MG disintegrating tablet Take 1 tablet by mouth Every 8 (Eight) Hours As Needed for Nausea or Vomiting. 30 tablet 1    promethazine (PHENERGAN) 25 MG tablet Take 1 tablet by mouth Every 6 (Six) Hours As Needed for Nausea or Vomiting. 10 tablet 0    QUEtiapine (SEROquel) 50 MG tablet Take 1 tablet by mouth Every Night. 30 tablet 5    SUMAtriptan (IMITREX) 50 MG tablet Take one tablet at onset of headache. May repeat dose one time in 2 hours if headache not relieved. 10 tablet 0     No current facility-administered medications for this visit.       Review of Systems  Review of systems is negative unless otherwise specified in HPI.    Objective   Vital Signs:  /87 (BP Location: Left arm, Patient Position: Sitting, Cuff Size: Adult) Comment (BP Location): lower arm  Pulse 87   Ht 162.6 cm (64.02\")   Wt (!) 142 kg (314 lb)   SpO2 98%   BMI 53.87 kg/m²   Estimated body mass index is 53.87 kg/m² as calculated from the following:    Height as of this encounter: 162.6 cm (64.02\").    Weight as of this encounter: 142 kg (314 lb).          Physical Exam  Vitals and nursing note reviewed.   Constitutional:       General: He is not in acute " distress.     Appearance: He is morbidly obese.   HENT:      Head: Normocephalic.      Nose: Nose normal.      Mouth/Throat:      Mouth: Mucous membranes are moist.   Cardiovascular:      Rate and Rhythm: Normal rate.   Pulmonary:      Effort: Pulmonary effort is normal. No respiratory distress.   Musculoskeletal:         General: Normal range of motion.      Cervical back: Normal range of motion.      Left knee: Swelling present. Tenderness present over the patellar tendon. Normal alignment. Normal pulse.   Skin:     General: Skin is warm and dry.   Neurological:      General: No focal deficit present.      Mental Status: He is alert.          Result Review :               Assessment and Plan   Diagnoses and all orders for this visit:    1. Intractable migraine without aura and without status migrainosus (Primary)  -     SUMAtriptan (IMITREX) 50 MG tablet; Take one tablet at onset of headache. May repeat dose one time in 2 hours if headache not relieved.  Dispense: 10 tablet; Refill: 0  -     Discontinue: ketorolac (TORADOL) injection 60 mg  -     ketorolac (TORADOL) injection 60 mg    2. Acute pain of left knee  -     XR Knee 3 View Left; Future  -     Discontinue: ketorolac (TORADOL) injection 60 mg  -     ketorolac (TORADOL) injection 60 mg    3. Hypertension, unspecified type      - Migraine has been uncontrolled over the past week despite OTC treatments. Will administer toradol injection in office today and prescribed imitrex.   - Left knee pain is a new persistent/worsening problem. Encouraged rest and will order xray of knee today. Continue OTC treatments and will give toradol injection in office today.   - Hypertension remains uncontrolled. Continue current treatment and monitor blood pressure at home.            Follow Up   Return if symptoms worsen or fail to improve.  Patient was given instructions and counseling regarding his condition or for health maintenance advice. Please see specific information  pulled into the AVS if appropriate.     Signed by:    EMANUEL Zuniga Date: 07/22/25

## 2025-07-28 ENCOUNTER — PRIOR AUTHORIZATION (OUTPATIENT)
Dept: FAMILY MEDICINE CLINIC | Facility: CLINIC | Age: 30
End: 2025-07-28
Payer: MEDICAID

## 2025-07-28 NOTE — TELEPHONE ENCOUNTER
Elias Schroeder (Tom: [id number])  Rx #: [id number]  Need Help? Call us at [phone]  Status  Sent to Plan today  Drug  QUEtiapine Fumarate 50MG tablets  ePA cloud logo  Form  [facility] [place] [facility] ePA Form 2017 NCPDP  Original Claim Info  75    Outcome  Approved on July 28 by [place] [employer] Med[employer] 2017  The request has been approved. The authorization is effective from 07/28/2025 to 07/28/2026, as long as the member is enrolled in their current health plan. The request was reviewed and approved by a licensed clinical pharmacist. A written notification letter will follow with additional details.  Effective Date: 7/28/2025  Authorization Expiration Date: 7/28/2026

## 2025-07-30 ENCOUNTER — OFFICE VISIT (OUTPATIENT)
Dept: FAMILY MEDICINE CLINIC | Facility: CLINIC | Age: 30
End: 2025-07-30
Payer: MEDICAID

## 2025-07-30 VITALS
BODY MASS INDEX: 52.92 KG/M2 | HEIGHT: 64 IN | DIASTOLIC BLOOD PRESSURE: 86 MMHG | HEART RATE: 86 BPM | OXYGEN SATURATION: 98 % | TEMPERATURE: 98.7 F | WEIGHT: 310 LBS | SYSTOLIC BLOOD PRESSURE: 141 MMHG

## 2025-07-30 DIAGNOSIS — E11.65 TYPE 2 DIABETES MELLITUS WITH HYPERGLYCEMIA, WITHOUT LONG-TERM CURRENT USE OF INSULIN: Chronic | ICD-10-CM

## 2025-07-30 DIAGNOSIS — K21.9 GASTROESOPHAGEAL REFLUX DISEASE, UNSPECIFIED WHETHER ESOPHAGITIS PRESENT: Primary | ICD-10-CM

## 2025-07-30 PROCEDURE — 99213 OFFICE O/P EST LOW 20 MIN: CPT

## 2025-07-30 PROCEDURE — 3046F HEMOGLOBIN A1C LEVEL >9.0%: CPT

## 2025-07-30 PROCEDURE — 1125F AMNT PAIN NOTED PAIN PRSNT: CPT

## 2025-07-30 PROCEDURE — 1160F RVW MEDS BY RX/DR IN RCRD: CPT

## 2025-07-30 PROCEDURE — 1159F MED LIST DOCD IN RCRD: CPT

## 2025-07-30 RX ORDER — PANTOPRAZOLE SODIUM 40 MG/1
40 TABLET, DELAYED RELEASE ORAL DAILY
Qty: 30 TABLET | Refills: 0 | Status: SHIPPED | OUTPATIENT
Start: 2025-07-30

## 2025-07-30 NOTE — PROGRESS NOTES
Chief Complaint  Heartburn (Throw up acid /Burning in diaphragm area ), Nausea (Throws up acid /Starting to happen more often, every couple of weeks ), Abdominal Pain (Cramping/Similar to period cramps //), Bloated, Anemia, and Medication Problem (Pepcid alternative: issues getting it filled /Also discuss glipizide - possible alternate/)    Subjective        Elias Schreoder presents to [facility]  History of Present Illness  Patient is a 29-year-old presenting today with ongoing complaints of reflux. Has been taking famotidine as needed with mild relief. Has also tried taking TUMS and omeprazole OTC with mild relief. Reports waking up and will be nauseous. With reflux will feel burning in throat and will vomit. Had vomited Monday and Tuesday due to nausea with reflux and this morning has been feeling burning all down his throat and epigastric area. Has tried to avoid acidic foods with no noticeable change. Does report drinking coffee, diet soda, an energy drink a few days a week.     Has been taking glipizide for diabetes. Concerned about this contributing reflux symptoms.       Past Medical History:   Diagnosis Date    ADHD     Anxiety     Depression     Diabetes mellitus     Insomnia     Obesity     Polycystic ovarian syndrome     Prediabetes      Past Surgical History:   Procedure Laterality Date    TONSILLECTOMY       Social History     Socioeconomic History    Marital status: Significant Other   Tobacco Use    Smoking status: Former     Current packs/day: 0.00     Average packs/day: 0.3 packs/day for 5.0 years (1.3 ttl pk-yrs)     Types: Cigarettes, Pipe, Cigars     Start date: 2013     Quit date: 2018     Years since quittin.5     Passive exposure: Past    Smokeless tobacco: Never   Vaping Use    Vaping status: Former    Passive vaping exposure: Yes   Substance and Sexual Activity    Alcohol use: Yes     Comment: rarely    Drug use: Yes     Types: Marijuana    Sexual  activity: Yes     Partners: Female     Birth control/protection: None, Partner of same sex     Comment: I’m a Trans Man so biological speaking we are same sex     Family History   Problem Relation Age of Onset    Depression Mother     Diabetes Mother     Fibromyalgia Mother     Heart attack Mother     Anxiety disorder Mother     Heart disease Mother     Mental illness Mother     Vision loss Mother     Diabetes Father     Depression Father     Kidney failure Father     Lupus Father     Alcohol abuse Father     Anxiety disorder Father     Drug abuse Father     Hearing loss Father     Kidney disease Father     Mental illness Father     Other Father         lupus    Fibromyalgia Maternal Grandmother     Arthritis Maternal Grandmother     Early death Paternal Uncle        Medications:  Current Outpatient Medications   Medication Sig Dispense Refill    albuterol sulfate  (90 Base) MCG/ACT inhaler Inhale 2 puffs Every 4 (Four) to 6 (Six) Hours As Needed for Wheezing or Shortness of Air (or persistant cough.). 18 g 0    budesonide-formoterol (Symbicort) 160-4.5 MCG/ACT inhaler Inhale 2 puffs 2 (Two) Times a Day. 10.2 g 1    DULoxetine (Cymbalta) 60 MG capsule Take 1 capsule by mouth Daily. 30 capsule 5    glipizide (Glucotrol XL) 10 MG 24 hr tablet Take 1 tablet by mouth Daily. 30 tablet 2    losartan (COZAAR) 25 MG tablet Take 1 tablet by mouth Daily. 30 tablet 2    metFORMIN ER (GLUCOPHAGE-XR) 500 MG 24 hr tablet Take 4 tablets by mouth Daily With Breakfast for 90 days. 120 tablet 2    ondansetron ODT (ZOFRAN-ODT) 4 MG disintegrating tablet Take 1 tablet by mouth Every 8 (Eight) Hours As Needed for Nausea or Vomiting. 30 tablet 1    promethazine (PHENERGAN) 25 MG tablet Take 1 tablet by mouth Every 6 (Six) Hours As Needed for Nausea or Vomiting. 10 tablet 0    QUEtiapine (SEROquel) 50 MG tablet Take 1 tablet by mouth Every Night. 30 tablet 5    SUMAtriptan (IMITREX) 50 MG tablet Take one tablet at onset of  "headache. May repeat dose one time in 2 hours if headache not relieved. 10 tablet 0    Elastic Bandages & Supports (ACE Ankle Brace) misc Use 1 Device As Needed (ankle pain and swelling). (Patient not taking: Reported on 7/30/2025) 1 each 0    Farxiga 10 MG tablet Take 10 mg by mouth Daily. (Patient not taking: Reported on 7/30/2025) 30 tablet 5    ferrous sulfate 324 (65 Fe) MG tablet delayed-release EC tablet Take 1 tablet by mouth Daily With Breakfast. (Patient not taking: Reported on 7/30/2025) 30 tablet 11    fluticasone (FLONASE) 50 MCG/ACT nasal spray Administer 2 sprays into the nostril(s) as directed by provider Daily. (Patient not taking: Reported on 7/30/2025) 16 g 5    pantoprazole (Protonix) 40 MG EC tablet Take 1 tablet by mouth Daily. 30 tablet 0     No current facility-administered medications for this visit.       Review of Systems  Review of systems is negative unless otherwise specified in HPI.    Objective   Vital Signs:  /86 (BP Location: Right arm, Patient Position: Sitting, Cuff Size: Large Adult)   Pulse 86   Temp 98.7 °F (37.1 °C) (Infrared)   Ht 162.6 cm (64.02\")   Wt (!) 141 kg (310 lb)   SpO2 98%   BMI 53.18 kg/m²   Estimated body mass index is 53.18 kg/m² as calculated from the following:    Height as of this encounter: 162.6 cm (64.02\").    Weight as of this encounter: 141 kg (310 lb).          Physical Exam  Vitals and nursing note reviewed.   Constitutional:       Appearance: He is morbidly obese.   HENT:      Head: Normocephalic.      Nose: Nose normal.      Mouth/Throat:      Mouth: Mucous membranes are moist.   Cardiovascular:      Rate and Rhythm: Normal rate.   Pulmonary:      Effort: Pulmonary effort is normal. No respiratory distress.   Musculoskeletal:         General: Normal range of motion.      Cervical back: Normal range of motion.   Skin:     General: Skin is warm and dry.   Neurological:      General: No focal deficit present.      Mental Status: He is alert. "          Result Review :  The following data was reviewed by: EMANUEL Zuniga on 07/30/2025:  A1C Last 3 Results          8/14/2024    08:39 6/16/2025    10:27   HGBA1C Last 3 Results   Hemoglobin A1C 8.2  9.6             Assessment and Plan   Diagnoses and all orders for this visit:    1. Gastroesophageal reflux disease, unspecified whether esophagitis present (Primary)  -     Ambulatory Referral to Gastroenterology  -     pantoprazole (Protonix) 40 MG EC tablet; Take 1 tablet by mouth Daily.  Dispense: 30 tablet; Refill: 0    2. Type 2 diabetes mellitus with hyperglycemia, without long-term current use of insulin      - Reflux is not controlled. Will start on PPI and place referral to gastroenterology for further evaluation. Provided diet information for GERD and reviewed this with patient.  - Diabetes has been uncontrolled. Will continue current treatment and encouraged to work on diet.            Follow Up   Return if symptoms worsen or fail to improve.  Patient was given instructions and counseling regarding his condition or for health maintenance advice. Please see specific information pulled into the AVS if appropriate.     Signed by:    EMANUEL Zuniga Date: 07/30/25

## 2025-07-30 NOTE — LETTER
July 30, 2025     Patient: Elias Schroeder   YOB: 1995   Date of Visit: 7/30/2025       To Whom It May Concern:    It is my medical opinion that Elias Schroeder may return to work on 7/31/2025.  Please excuse from work Monday 7/28/2025.         Sincerely,    Remy Patel, EMANUEL Date: 07/30/25

## 2025-07-30 NOTE — LETTER
July 30, 2025     Patient: Elias Schroeder   YOB: 1995   Date of Visit: 7/30/2025       To Whom It May Concern:    It is my medical opinion that Elias Schrodeer may return to work on 8/1/2025. Please excuse from work 7/31/2025.         Sincerely,    Remy Patel, EMANUEL Date: 07/30/25